# Patient Record
Sex: FEMALE
[De-identification: names, ages, dates, MRNs, and addresses within clinical notes are randomized per-mention and may not be internally consistent; named-entity substitution may affect disease eponyms.]

---

## 2022-10-06 DIAGNOSIS — Z00.00 ENCOUNTER FOR GENERAL ADULT MEDICAL EXAMINATION W/OUT ABNORMAL FINDINGS: ICD-10-CM

## 2022-10-07 ENCOUNTER — APPOINTMENT (OUTPATIENT)
Dept: ORTHOPEDIC SURGERY | Facility: CLINIC | Age: 62
End: 2022-10-07

## 2024-10-19 ENCOUNTER — NON-APPOINTMENT (OUTPATIENT)
Age: 64
End: 2024-10-19

## 2024-10-19 ENCOUNTER — INPATIENT (INPATIENT)
Facility: HOSPITAL | Age: 64
LOS: 4 days | Discharge: HOME | DRG: 313 | End: 2024-10-24
Attending: INTERNAL MEDICINE | Admitting: INTERNAL MEDICINE
Payer: MEDICARE

## 2024-10-19 VITALS
SYSTOLIC BLOOD PRESSURE: 144 MMHG | RESPIRATION RATE: 16 BRPM | WEIGHT: 179.9 LBS | OXYGEN SATURATION: 97 % | DIASTOLIC BLOOD PRESSURE: 87 MMHG | HEART RATE: 82 BPM | TEMPERATURE: 98 F | HEIGHT: 64 IN

## 2024-10-19 LAB
ALBUMIN SERPL ELPH-MCNC: 4.4 G/DL — SIGNIFICANT CHANGE UP (ref 3.3–5)
ALP SERPL-CCNC: 59 U/L — SIGNIFICANT CHANGE UP (ref 40–120)
ALT FLD-CCNC: 21 U/L — SIGNIFICANT CHANGE UP (ref 10–45)
ANION GAP SERPL CALC-SCNC: 13 MMOL/L — SIGNIFICANT CHANGE UP (ref 5–17)
AST SERPL-CCNC: 37 U/L — SIGNIFICANT CHANGE UP (ref 10–40)
BASOPHILS # BLD AUTO: 0.06 K/UL — SIGNIFICANT CHANGE UP (ref 0–0.2)
BASOPHILS NFR BLD AUTO: 0.5 % — SIGNIFICANT CHANGE UP (ref 0–2)
BILIRUB SERPL-MCNC: 0.7 MG/DL — SIGNIFICANT CHANGE UP (ref 0.2–1.2)
BUN SERPL-MCNC: 18 MG/DL — SIGNIFICANT CHANGE UP (ref 7–23)
CALCIUM SERPL-MCNC: 10.1 MG/DL — SIGNIFICANT CHANGE UP (ref 8.4–10.5)
CHLORIDE SERPL-SCNC: 107 MMOL/L — SIGNIFICANT CHANGE UP (ref 96–108)
CO2 SERPL-SCNC: 21 MMOL/L — LOW (ref 22–31)
CREAT SERPL-MCNC: 1 MG/DL — SIGNIFICANT CHANGE UP (ref 0.5–1.3)
EGFR: 63 ML/MIN/1.73M2 — SIGNIFICANT CHANGE UP
EOSINOPHIL # BLD AUTO: 0.03 K/UL — SIGNIFICANT CHANGE UP (ref 0–0.5)
EOSINOPHIL NFR BLD AUTO: 0.3 % — SIGNIFICANT CHANGE UP (ref 0–6)
GLUCOSE SERPL-MCNC: 105 MG/DL — HIGH (ref 70–99)
HCT VFR BLD CALC: 31.3 % — LOW (ref 34.5–45)
HGB BLD-MCNC: 11.5 G/DL — SIGNIFICANT CHANGE UP (ref 11.5–15.5)
IMM GRANULOCYTES NFR BLD AUTO: 0.6 % — SIGNIFICANT CHANGE UP (ref 0–0.9)
LYMPHOCYTES # BLD AUTO: 3.83 K/UL — HIGH (ref 1–3.3)
LYMPHOCYTES # BLD AUTO: 33.6 % — SIGNIFICANT CHANGE UP (ref 13–44)
MAGNESIUM SERPL-MCNC: 2.3 MG/DL — SIGNIFICANT CHANGE UP (ref 1.6–2.6)
MCHC RBC-ENTMCNC: 29.1 PG — SIGNIFICANT CHANGE UP (ref 27–34)
MCHC RBC-ENTMCNC: 36.7 GM/DL — HIGH (ref 32–36)
MCV RBC AUTO: 79.2 FL — LOW (ref 80–100)
MONOCYTES # BLD AUTO: 0.94 K/UL — HIGH (ref 0–0.9)
MONOCYTES NFR BLD AUTO: 8.3 % — SIGNIFICANT CHANGE UP (ref 2–14)
NEUTROPHILS # BLD AUTO: 6.46 K/UL — SIGNIFICANT CHANGE UP (ref 1.8–7.4)
NEUTROPHILS NFR BLD AUTO: 56.7 % — SIGNIFICANT CHANGE UP (ref 43–77)
NRBC # BLD: 0 /100 WBCS — SIGNIFICANT CHANGE UP (ref 0–0)
NT-PROBNP SERPL-SCNC: <36 PG/ML — SIGNIFICANT CHANGE UP (ref 0–300)
PHOSPHATE SERPL-MCNC: 3.8 MG/DL — SIGNIFICANT CHANGE UP (ref 2.5–4.5)
PLATELET # BLD AUTO: 346 K/UL — SIGNIFICANT CHANGE UP (ref 150–400)
POTASSIUM SERPL-MCNC: 4.6 MMOL/L — SIGNIFICANT CHANGE UP (ref 3.5–5.3)
POTASSIUM SERPL-SCNC: 4.6 MMOL/L — SIGNIFICANT CHANGE UP (ref 3.5–5.3)
PROT SERPL-MCNC: 8 G/DL — SIGNIFICANT CHANGE UP (ref 6–8.3)
RBC # BLD: 3.95 M/UL — SIGNIFICANT CHANGE UP (ref 3.8–5.2)
RBC # FLD: 16.2 % — HIGH (ref 10.3–14.5)
SODIUM SERPL-SCNC: 141 MMOL/L — SIGNIFICANT CHANGE UP (ref 135–145)
TROPONIN T, HIGH SENSITIVITY RESULT: <6 NG/L — SIGNIFICANT CHANGE UP (ref 0–51)
TROPONIN T, HIGH SENSITIVITY RESULT: <6 NG/L — SIGNIFICANT CHANGE UP (ref 0–51)
WBC # BLD: 11.39 K/UL — HIGH (ref 3.8–10.5)
WBC # FLD AUTO: 11.39 K/UL — HIGH (ref 3.8–10.5)

## 2024-10-19 PROCEDURE — 99223 1ST HOSP IP/OBS HIGH 75: CPT

## 2024-10-19 PROCEDURE — 71046 X-RAY EXAM CHEST 2 VIEWS: CPT | Mod: 26

## 2024-10-19 PROCEDURE — 71275 CT ANGIOGRAPHY CHEST: CPT | Mod: 26,MC

## 2024-10-19 PROCEDURE — 74174 CTA ABD&PLVS W/CONTRAST: CPT | Mod: 26,MC

## 2024-10-19 RX ORDER — APIXABAN 5 MG/1
5 TABLET, FILM COATED ORAL EVERY 12 HOURS
Refills: 0 | Status: DISCONTINUED | OUTPATIENT
Start: 2024-10-19 | End: 2024-10-24

## 2024-10-19 RX ORDER — ACETAMINOPHEN 500 MG
1000 TABLET ORAL ONCE
Refills: 0 | Status: COMPLETED | OUTPATIENT
Start: 2024-10-19 | End: 2024-10-19

## 2024-10-19 RX ORDER — MECLIZINE HCL 25 MG
50 TABLET ORAL ONCE
Refills: 0 | Status: COMPLETED | OUTPATIENT
Start: 2024-10-19 | End: 2024-10-19

## 2024-10-19 RX ADMIN — Medication 400 MILLIGRAM(S): at 17:29

## 2024-10-19 RX ADMIN — Medication 50 MILLIGRAM(S): at 21:10

## 2024-10-19 RX ADMIN — APIXABAN 5 MILLIGRAM(S): 5 TABLET, FILM COATED ORAL at 23:29

## 2024-10-19 RX ADMIN — Medication 2000 MILLILITER(S): at 21:11

## 2024-10-19 NOTE — ED ADULT NURSE NOTE - OBJECTIVE STATEMENT
1702 pt 64yf aox4 bib ems/go health c/o cp sob on exertion, sent for eval hx pe, vitals wnl, able to verbalize concerns, given asa 324 and iv zofran 4mg, ivl rt fa 20g from ems pending eval

## 2024-10-19 NOTE — ED PROVIDER NOTE - PROGRESS NOTE DETAILS
NILE Kelly PGY-3: Dr. Crowell at bedside, thinking this is a pneumonia.  Patient would be admitted to his service.

## 2024-10-19 NOTE — ED ADULT NURSE REASSESSMENT NOTE - NSFALLHARMRISKINTERV_ED_ALL_ED
Communicate risk of Fall with Harm to all staff, patient, and family/Encourage patient to sit up slowly, dangle for a short time, stand at bedside before walking/Monitor gait and stability/Move patient closer to nursing station/within visual sight of ED staff/Provide visual cue: red socks, yellow wristband, yellow gown, etc/Reinforce activity limits and safety measures with patient and family/Bed in lowest position, wheels locked, appropriate side rails in place/Call bell, personal items and telephone in reach/Instruct patient to call for assistance before getting out of bed/chair/stretcher/Non-slip footwear applied when patient is off stretcher/[place] to call system/Physically safe environment - no spills, clutter or unnecessary equipment/Purposeful Proactive Rounding/Room/bathroom lighting operational, light cord in reach

## 2024-10-19 NOTE — ED PROVIDER NOTE - OBJECTIVE STATEMENT
64 yof hx of PE on AC and compliant, BIBEMS from clinic c/f waxin waning CP. is described as dull pressure in midchest. No fevers, no chills, n/v/d. Given ASA enroute.

## 2024-10-19 NOTE — ED PROVIDER NOTE - ATTENDING CONTRIBUTION TO CARE
------------ATTENDING NOTE------------  pt w/  brought to ED by EMS from outpt clinic for concerns of chest pain, pt describes sudden onset of constant waxing/waning chest pain, dull ache in center of chest radiating to back and down to abdomen and LLE, slight lightheaded/sob, no numbness/weakness, different than past PE and pt compliant w/ Eliquis,  mg by EMS, exam w/ equal distal pulses, overall soft benign abd, clear chest w/o distress, nml cardiac sounds -- awaiting EKG, labs, imaging, close reassessments -->  - Adolfo Barnard MD   ---------------------------------------------------------- ------------ATTENDING NOTE------------  pt w/  brought to ED by EMS from outpt clinic for concerns of chest pain, pt describes sudden onset of constant waxing/waning chest pain, dull ache in center of chest radiating to back and down to abdomen and LLE, slight lightheaded/sob, no numbness/weakness, different than past PE and pt compliant w/ Eliquis,  mg by EMS, exam w/ equal distal pulses, overall soft benign abd, clear chest w/o distress, nml cardiac sounds -- awaiting EKG, labs, imaging, close reassessments --> initial labs/imaging w/o acute cardiac/pulm process, plan for CDU for TTE, cardiac monitoring, pt's Cardiologist consult, close reassessments for tx/dispo decisions.  - Adolfo Barnard MD   ----------------------------------------------------------

## 2024-10-19 NOTE — ED PROVIDER NOTE - PHYSICAL EXAMINATION
Patient no apparent distress, normal S1-S2, soft nontender abdomen.  No distention, there is no pedal edema.  No swelling bilateral unilateral of the lower extremities.  Breath sounds are clear as well bilaterally.  No rhonchi.  Patient without any accessory muscle usage.  Patient without any pulsatile masses appreciated in middle of her abdomen.

## 2024-10-19 NOTE — ED ADULT NURSE NOTE - NSFALLHARMRISKINTERV_ED_ALL_ED

## 2024-10-19 NOTE — ED CDU PROVIDER INITIAL DAY NOTE - OBJECTIVE STATEMENT
63 y/o F with PMH HTN, HLD, PE on Eliquis presents to the ER with chest pain starting at noon today. Reports sharp left sided chest pain that radiates to the middle of her chest/back/left leg. At this time reports improvement in pain although still "achy." Reports feeling more shortness of breath than at her baseline and feels more lightheaded. Since onset of symptoms she reports feeling like her right arm is weaker than her left, although does endorse improvement at this time. Denies fevers, cough, syncope. Reports having a stress test with her cardiologist last week, although unsure of results at this time. 63 y/o F with PMH HTN, HLD, PE on Eliquis presents to the ER with chest pain starting at noon today. Reports sharp left sided chest pain that radiates to the middle of her chest/back/left leg. At this time reports improvement in pain although still "achy." Reports feeling more shortness of breath than at her baseline and feels more lightheaded. Since onset of symptoms she reports feeling like her right arm is weaker than her left, although does endorse improvement at this time. Denies fevers, cough, syncope. Reports having a stress test with her cardiologist last week, although unsure of results at this time.  ED course: EKG NSR. Troponin <6, <6. 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 Plan for tele monitoring, echocardiogram, and cardiology consult in CDU.

## 2024-10-19 NOTE — ED CDU PROVIDER INITIAL DAY NOTE - PROGRESS NOTE DETAILS
Upon my initial CDU evaluation patient endorsed dizziness with ambulation, given IVF and Meclizine with patient endorsing improvement in symptoms afterwards, with steady gait on reevaluation. Patient also mentioned to myself that since onset of symptoms at noon today she noticed that her right arm felt weaker than her left arm. Patient with equal 5/5 strength bilateral upper extremities, denying numbness. Discussed with Dr. Barnard- states would not obtain CT head at this time, would continue to monitor on tele and obtain echocardiogram and cardiology consult. - Becca Mandujano PA-C Upon my initial CDU evaluation patient endorsed dizziness with ambulation, given IVF and Meclizine with patient endorsing improvement in symptoms afterwards, with steady gait on reevaluation. Patient also mentioned to myself that since onset of symptoms at noon today she noticed that her right arm felt weaker than her left arm. Patient with equal 5/5 strength bilateral upper extremities, no pronator drift, denying numbness. Discussed with Dr. Barnard- states would not obtain CT head at this time, would continue to monitor on tele and obtain echocardiogram and cardiology consult. - Becca Mandujano PA-C

## 2024-10-20 DIAGNOSIS — I26.99 OTHER PULMONARY EMBOLISM WITHOUT ACUTE COR PULMONALE: ICD-10-CM

## 2024-10-20 DIAGNOSIS — J18.9 PNEUMONIA, UNSPECIFIED ORGANISM: ICD-10-CM

## 2024-10-20 DIAGNOSIS — R07.9 CHEST PAIN, UNSPECIFIED: ICD-10-CM

## 2024-10-20 LAB
ALBUMIN SERPL ELPH-MCNC: 3.8 G/DL — SIGNIFICANT CHANGE UP (ref 3.3–5)
ALP SERPL-CCNC: 53 U/L — SIGNIFICANT CHANGE UP (ref 40–120)
ALT FLD-CCNC: 20 U/L — SIGNIFICANT CHANGE UP (ref 10–45)
ANION GAP SERPL CALC-SCNC: 16 MMOL/L — SIGNIFICANT CHANGE UP (ref 5–17)
AST SERPL-CCNC: 31 U/L — SIGNIFICANT CHANGE UP (ref 10–40)
BASOPHILS # BLD AUTO: 0.1 K/UL — SIGNIFICANT CHANGE UP (ref 0–0.2)
BASOPHILS NFR BLD AUTO: 1.1 % — SIGNIFICANT CHANGE UP (ref 0–2)
BILIRUB SERPL-MCNC: 0.7 MG/DL — SIGNIFICANT CHANGE UP (ref 0.2–1.2)
BUN SERPL-MCNC: 14 MG/DL — SIGNIFICANT CHANGE UP (ref 7–23)
CALCIUM SERPL-MCNC: 9.5 MG/DL — SIGNIFICANT CHANGE UP (ref 8.4–10.5)
CHLORIDE SERPL-SCNC: 105 MMOL/L — SIGNIFICANT CHANGE UP (ref 96–108)
CO2 SERPL-SCNC: 17 MMOL/L — LOW (ref 22–31)
CREAT SERPL-MCNC: 0.92 MG/DL — SIGNIFICANT CHANGE UP (ref 0.5–1.3)
EGFR: 69 ML/MIN/1.73M2 — SIGNIFICANT CHANGE UP
EOSINOPHIL # BLD AUTO: 0.08 K/UL — SIGNIFICANT CHANGE UP (ref 0–0.5)
EOSINOPHIL NFR BLD AUTO: 0.8 % — SIGNIFICANT CHANGE UP (ref 0–6)
FLUAV AG NPH QL: SIGNIFICANT CHANGE UP
FLUBV AG NPH QL: SIGNIFICANT CHANGE UP
GLUCOSE SERPL-MCNC: 98 MG/DL — SIGNIFICANT CHANGE UP (ref 70–99)
HCT VFR BLD CALC: 34.1 % — LOW (ref 34.5–45)
HGB BLD-MCNC: 12 G/DL — SIGNIFICANT CHANGE UP (ref 11.5–15.5)
IMM GRANULOCYTES NFR BLD AUTO: 0.2 % — SIGNIFICANT CHANGE UP (ref 0–0.9)
LYMPHOCYTES # BLD AUTO: 5.32 K/UL — HIGH (ref 1–3.3)
LYMPHOCYTES # BLD AUTO: 56.4 % — HIGH (ref 13–44)
MCHC RBC-ENTMCNC: 28.3 PG — SIGNIFICANT CHANGE UP (ref 27–34)
MCHC RBC-ENTMCNC: 35.2 GM/DL — SIGNIFICANT CHANGE UP (ref 32–36)
MCV RBC AUTO: 80.4 FL — SIGNIFICANT CHANGE UP (ref 80–100)
MONOCYTES # BLD AUTO: 0.69 K/UL — SIGNIFICANT CHANGE UP (ref 0–0.9)
MONOCYTES NFR BLD AUTO: 7.3 % — SIGNIFICANT CHANGE UP (ref 2–14)
NEUTROPHILS # BLD AUTO: 3.22 K/UL — SIGNIFICANT CHANGE UP (ref 1.8–7.4)
NEUTROPHILS NFR BLD AUTO: 34.2 % — LOW (ref 43–77)
NRBC # BLD: 0 /100 WBCS — SIGNIFICANT CHANGE UP (ref 0–0)
PLATELET # BLD AUTO: 254 K/UL — SIGNIFICANT CHANGE UP (ref 150–400)
POTASSIUM SERPL-MCNC: 4.2 MMOL/L — SIGNIFICANT CHANGE UP (ref 3.5–5.3)
POTASSIUM SERPL-SCNC: 4.2 MMOL/L — SIGNIFICANT CHANGE UP (ref 3.5–5.3)
PROT SERPL-MCNC: 7.4 G/DL — SIGNIFICANT CHANGE UP (ref 6–8.3)
RBC # BLD: 4.24 M/UL — SIGNIFICANT CHANGE UP (ref 3.8–5.2)
RBC # FLD: 16 % — HIGH (ref 10.3–14.5)
RSV RNA NPH QL NAA+NON-PROBE: SIGNIFICANT CHANGE UP
SARS-COV-2 RNA SPEC QL NAA+PROBE: SIGNIFICANT CHANGE UP
SODIUM SERPL-SCNC: 138 MMOL/L — SIGNIFICANT CHANGE UP (ref 135–145)
WBC # BLD: 9.43 K/UL — SIGNIFICANT CHANGE UP (ref 3.8–10.5)
WBC # FLD AUTO: 9.43 K/UL — SIGNIFICANT CHANGE UP (ref 3.8–10.5)

## 2024-10-20 PROCEDURE — 76376 3D RENDER W/INTRP POSTPROCES: CPT | Mod: 26

## 2024-10-20 PROCEDURE — 93306 TTE W/DOPPLER COMPLETE: CPT | Mod: 26

## 2024-10-20 PROCEDURE — 99233 SBSQ HOSP IP/OBS HIGH 50: CPT

## 2024-10-20 RX ORDER — CEFTRIAXONE SODIUM 10 G
1000 VIAL (EA) INJECTION EVERY 24 HOURS
Refills: 0 | Status: COMPLETED | OUTPATIENT
Start: 2024-10-20 | End: 2024-10-24

## 2024-10-20 RX ORDER — INFLUENZ VIR VAC TV P-SURF2003 15MCG/.5ML
0.5 SYRINGE (ML) INTRAMUSCULAR ONCE
Refills: 0 | Status: DISCONTINUED | OUTPATIENT
Start: 2024-10-20 | End: 2024-10-24

## 2024-10-20 RX ADMIN — APIXABAN 5 MILLIGRAM(S): 5 TABLET, FILM COATED ORAL at 21:21

## 2024-10-20 RX ADMIN — Medication 100 MILLIGRAM(S): at 06:44

## 2024-10-20 RX ADMIN — APIXABAN 5 MILLIGRAM(S): 5 TABLET, FILM COATED ORAL at 10:44

## 2024-10-20 NOTE — CONSULT NOTE ADULT - ASSESSMENT
64 yof well known to me from office w/ hx of PE on AC and compliant, BIBEMS from clinic c/f waxing waning CP. is described as dull pressure in midchest. No fevers, no chills, n/v/d. Given ASA enrout  Of note she had a Nuclear stress test in my office last week

## 2024-10-20 NOTE — H&P ADULT - NSHPLABSRESULTS_GEN_ALL_CORE
< from: CT Angio Chest Aorta w/wo IV Cont (10.19.24 @ 18:12) >    IMPRESSION:  No aortic dissection or aortic aneurysm or pulmonary embolism.    Bilateral mosaic lungattenuation mostly in the lower lobes. Differential   diagnosis includes air trapping, alveolar edema, or groundglass   infiltrates. Small patchy bilateral lower lobe opacities could represent   developing pneumonia.    Gastric wall thickening versusunderdistention, correlate clinically for   gastritis.    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 Patchy sclerosis   L4, L5 and S1 vertebral bodies. Correlate clinically for history of   sickle cell disease.    Additional findings noted above.    --- End of Report ---      < end of copied text >    Troponin T, High Sensitivity (10.19.24 @ 20:01)   Troponin T, High Sensitivity Result: <6: * Troponin T, High Sensitivity Result: <6: *                               12.0   9.43  )-----------( 254      ( 20 Oct 2024 04:45 )             34.1     10-20    138  |  105  |  14  ----------------------------<  98  4.2   |  17[L]  |  0.92    Ca    9.5      20 Oct 2024 04:45  Phos  3.8     10-19  Mg     2.3     10-19    TPro  7.4  /  Alb  3.8  /  TBili  0.7  /  DBili  x   /  AST  31  /  ALT  20  /  AlkPhos  53  10-20    proBNP:   Lipid Profile:   HgA1c:   TSH:

## 2024-10-20 NOTE — ED CDU PROVIDER SUBSEQUENT DAY NOTE - ATTENDING APP SHARED VISIT CONTRIBUTION OF CARE
Guzman Pozo MD (Attending Physician):    I performed a history and physical exam of the patient and discussed their management with the HOWARD. I have reviewed the HOWARD note and agree with the documented findings and plan of care, except as noted. This was a shared visit with an HOWARD. I reviewed and verified the documentation and independently performed my own history/exam/medical decision making. My medical decision making and observations are found below. Please refer to any progress notes for updates on clinical course.    HPI:  63 y/o F with PMH HTN, HLD, PE on Eliquis presents to the ER with chest pain starting at noon today. Reports sharp left sided chest pain that radiates to the middle of her chest/back/left leg. At this time reports improvement in pain although still "achy." Reports feeling more shortness of breath than at her baseline and feels more lightheaded. Since onset of symptoms she reports feeling like her right arm is weaker than her left, although does endorse improvement at this time. Denies fevers, cough, syncope. Reports having a stress test with her cardiologist last week, although unsure of results at this time.    PE:  GEN - NAD, well appearing, A&Ox3  HEAD - NC/AT  EYES - PERRL, EOMI  ENT - Airway patent, mucous membranes moist  PULMONARY - CTA b/l, symmetric breath sounds, no W/R/R  CARDIAC - +S1S2, RRR, no M/G/R, no JVD  ABDOMEN - +BS, ND, NT, soft, no guarding, no rebound, no masses, no rigidity   - No CVA TTP b/l  EXTREMITIES - FROM, symmetric pulses, no edema  SKIN - No rash or bruising  NEUROLOGIC - Alert, speech clear, moving all extremities spontaneously, no focal deficits  PSYCH - Normal mood/affect, normal insight    MDM:  ED course: EKG NSR. Troponin <6, <6. 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 Pt sent to CDU for tele monitoring, echocardiogram, and cardiology consult.

## 2024-10-20 NOTE — H&P ADULT - NSHPREVIEWOFSYSTEMS_GEN_ALL_CORE
REVIEW OF SYSTEMS:  CONSTITUTIONAL: No fever, weight loss, or fatigue  EYES: No eye pain, visual disturbances, or discharge  ENMT:  No difficulty hearing, tinnitus, vertigo; No sinus or throat pain  NECK: No pain or stiffness  RESPIRATORY:+cough, wheezing, chills or hemoptysis; No Shortness of Breath  CARDIOVASCULAR:+ chest pain, palpitations, passing out, dizziness, or leg swelling  GASTROINTESTINAL: No abdominal or epigastric pain. No nausea, vomiting, or hematemesis; No diarrhea or constipation. No melena or hematochezia.  GENITOURINARY: No dysuria, frequency, hematuria, or incontinence  NEUROLOGICAL: No headaches, memory loss, loss of strength, numbness, or tremors  SKIN: No itching, burning, rashes, or lesions   LYMPH Nodes: No enlarged glands  ENDOCRINE: No heat or cold intolerance; No hair loss  MUSCULOSKELETAL: No joint pain or swelling; No muscle, back, or extremity pain  PSYCHIATRIC: No depression, anxiety, mood swings, or difficulty sleeping  HEME/LYMPH: No easy bruising, or bleeding gums  ALLERY AND IMMUNOLOGIC: No hives or eczema	    [ ] All others negative	  [ ] Unable to obtain

## 2024-10-20 NOTE — ED CDU PROVIDER SUBSEQUENT DAY NOTE - PROGRESS NOTE DETAILS
Dr. Spears at bedside, recommending admission to his service for treatment for pneumonia, along with full RVP. Discussed with CDU attending Dr. Pozo. - Becca Mandujano PA-C

## 2024-10-20 NOTE — CONSULT NOTE ADULT - PROBLEM SELECTOR RECOMMENDATION 9
mainly non cardiac features   Normal SPECT without any ischemia last week in my office   has normal LVEF  symptoms likely secondary to PNA

## 2024-10-20 NOTE — H&P ADULT - NSHPPHYSICALEXAM_GEN_ALL_CORE
PHYSICAL EXAM:  T(C): 36.7 (10-20-24 @ 04:19), Max: 36.9 (10-19-24 @ 16:49)  HR: 65 (10-20-24 @ 04:19) (65 - 82)  BP: 126/81 (10-20-24 @ 04:19) (109/68 - 144/87)  RR: 18 (10-20-24 @ 04:19) (16 - 18)  SpO2: 98% (10-20-24 @ 04:19) (97% - 98%)  Wt(kg): --  I&O's Summary      Appearance: Normal	  HEENT:   Normal oral mucosa, PERRL, EOMI	  Lymphatic: No lymphadenopathy  Cardiovascular: Normal S1 S2, No JVD, No murmurs, No edema  Respiratory: Lungs clear to auscultation	  Psychiatry: A & O x 3, Mood & affect appropriate  Gastrointestinal:  Soft, Non-tender, + BS	  Skin: No rashes, No ecchymoses, No cyanosis	  Neurologic: Non-focal  Extremities: Normal range of motion, No clubbing, cyanosis or edema  Vascular: Peripheral pulses palpable 2+ bilaterally

## 2024-10-20 NOTE — ED CDU PROVIDER DISPOSITION NOTE - NSFOLLOWUPINSTRUCTIONS_ED_ALL_ED_FT
Hydrate.     We recommend you follow up with your primary care provider within the next 2-3 days, please bring all of your results with you.     Please return to the Emergency Department with new, worsening, or concerning symptoms, such as:  -Shortness of breath or trouble breathing  -Pressure, pain, tightness in chest  -Facial drooping, arm weakness, or speech difficulty   -Head injury or loss of consciousness   -Nonstop bleeding or an open wound     *More detailed information regarding your visit and discharge can be found by reviewing this packet

## 2024-10-20 NOTE — H&P ADULT - PROBLEM SELECTOR PLAN 1
mainly non cardiac features   Normal SPECT without any ischemia last week in my office   has normal LVEF  symptoms likely secondary to PNA.

## 2024-10-20 NOTE — ED ADULT NURSE REASSESSMENT NOTE - NS ED NURSE REASSESS COMMENT FT1
Pt received from MATILDE Roman. Pt oriented to CDU & plan of care was discussed. Pt A&O x 4. Pt admitted waiting for a bed. Pt c/o 2/10 chest pain that comes and goes since yesterday and some SOB. Pt denies any dizziness or palpitations as of now. Pt on a cardiac monitor in NSR, HR in 60's. V/S stable, pt afebrile,  IV in place, patent and free of signs of infiltration. Pt resting in bed. Safety & comfort measures maintained. Call bell in reach. Will continue to monitor.
Pt received from MATILDE Camilo. Pt oriented to CDU & plan of care was discussed. Pt A&O x 4. Independent. Pt in CDU for telemetry and TTE.  Pt denies any chest pain, SOB, dizziness or palpitations at this time. V/S stable, pt afebrile,  IV in place, patent and free of signs of infiltration. Pt resting in bed. Safety & comfort measures maintained. Call bell in reach. Care continues.

## 2024-10-20 NOTE — ED CDU PROVIDER SUBSEQUENT DAY NOTE - PHYSICAL EXAMINATION
· CONSTITUTIONAL: Well appearing, awake, alert, oriented to person, place, time/situation and in no apparent distress.  · ENMT: Airway patent.  · EYES: Clear bilaterally  · CARDIAC: Normal rate, regular rhythm.  Heart sounds S1, S2.  No murmur  · RESPIRATORY: Breath sounds clear and equal bilaterally.  · GASTROINTESTINAL: Abdomen soft, non-tender  · MUSCULOSKELETAL: Steady gait  · NEUROLOGICAL: Strength 5/5 bilateral UE/LE, sensation grossly intact, clear speech, follows commands  · PSYCHIATRIC: normal mood and affect.

## 2024-10-20 NOTE — ED CDU PROVIDER DISPOSITION NOTE - CLINICAL COURSE
65 y/o F with PMH HTN, HLD, PE on Eliquis presents to the ER with chest pain starting at noon today. Reports sharp left sided chest pain that radiates to the middle of her chest/back/left leg. At this time reports improvement in pain although still "achy." Reports feeling more shortness of breath than at her baseline and feels more lightheaded. Since onset of symptoms she reports feeling like her right arm is weaker than her left, although does endorse improvement at this time. Denies fevers, cough, syncope. Reports having a stress test with her cardiologist last week, although unsure of results at this time.  ED course: EKG NSR. Troponin <6, <6. 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 Plan for tele monitoring, echocardiogram, and cardiology consult in CDU. 65 y/o F with PMH HTN, HLD, PE on Eliquis presents to the ER with chest pain starting at noon today. Reports sharp left sided chest pain that radiates to the middle of her chest/back/left leg. At this time reports improvement in pain although still "achy." Reports feeling more shortness of breath than at her baseline and feels more lightheaded. Since onset of symptoms she reports feeling like her right arm is weaker than her left, although does endorse improvement at this time. Denies fevers, cough, syncope. Reports having a stress test with her cardiologist last week, although unsure of results at this time.  ED course: EKG NSR. Troponin <6, <6. 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 Plan for tele monitoring, echocardiogram, and cardiology consult in CDU. In CDU, patient evaluated by Dr. Spears, who accepted patient to his service for admission for pneumonia. Discussed with Dr. Pozo.

## 2024-10-20 NOTE — ED CDU PROVIDER DISPOSITION NOTE - ATTENDING APP SHARED VISIT CONTRIBUTION OF CARE
Guzman Pozo MD (Attending Physician):    I performed a history and physical exam of the patient and discussed their management with the HOWARD. I have reviewed the HOWARD note and agree with the documented findings and plan of care, except as noted. This was a shared visit with an HOWARD. I reviewed and verified the documentation and independently performed my own history/exam/medical decision making. My medical decision making and observations are found below. Please refer to any progress notes for updates on clinical course.    HPI:  65 y/o F with PMH HTN, HLD, PE on Eliquis presents to the ER with chest pain starting at noon today. Reports sharp left sided chest pain that radiates to the middle of her chest/back/left leg. At this time reports improvement in pain although still "achy." Reports feeling more shortness of breath than at her baseline and feels more lightheaded. Since onset of symptoms she reports feeling like her right arm is weaker than her left, although does endorse improvement at this time. Denies fevers, cough, syncope. Reports having a stress test with her cardiologist last week, although unsure of results at this time.    PE:  GEN - NAD, well appearing, A&Ox3  HEAD - NC/AT  EYES - PERRL, EOMI  ENT - Airway patent, mucous membranes moist  PULMONARY - CTA b/l, symmetric breath sounds, no W/R/R  CARDIAC - +S1S2, RRR, no M/G/R, no JVD  ABDOMEN - +BS, ND, NT, soft, no guarding, no rebound, no masses, no rigidity   - No CVA TTP b/l  EXTREMITIES - FROM, symmetric pulses, no edema  SKIN - No rash or bruising  NEUROLOGIC - Alert, speech clear, moving all extremities spontaneously, no focal deficits  PSYCH - Normal mood/affect, normal insight    MDM:  ED course: EKG NSR. Troponin <6, <6. 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 Pt sent to CDU for tele monitoring, echocardiogram, and cardiology consult. In CDU, patient evaluated by Dr. Spears, who accepted patient to his service for admission for pneumonia.

## 2024-10-20 NOTE — ED CDU PROVIDER SUBSEQUENT DAY NOTE - HISTORY
No interval changes since initial CDU provider note. Pt without new complaint. NAD VSS. no events on tele. Pending echocardiogram and cardiology consult. - LUIS Mandujano

## 2024-10-21 ENCOUNTER — TRANSCRIPTION ENCOUNTER (OUTPATIENT)
Age: 64
End: 2024-10-21

## 2024-10-21 LAB — ADD ON TEST-SPECIMEN IN LAB: SIGNIFICANT CHANGE UP

## 2024-10-21 RX ORDER — FAMOTIDINE 10 MG/ML
20 INJECTION INTRAVENOUS DAILY
Refills: 0 | Status: DISCONTINUED | OUTPATIENT
Start: 2024-10-21 | End: 2024-10-24

## 2024-10-21 RX ORDER — FLUTICASONE PROPIONATE AND SALMETEROL XINAFOATE 230; 21 UG/1; UG/1
1 AEROSOL, METERED RESPIRATORY (INHALATION)
Refills: 0 | Status: DISCONTINUED | OUTPATIENT
Start: 2024-10-21 | End: 2024-10-24

## 2024-10-21 RX ORDER — PANTOPRAZOLE SODIUM 40 MG/1
40 TABLET, DELAYED RELEASE ORAL
Refills: 0 | Status: DISCONTINUED | OUTPATIENT
Start: 2024-10-21 | End: 2024-10-24

## 2024-10-21 RX ADMIN — APIXABAN 5 MILLIGRAM(S): 5 TABLET, FILM COATED ORAL at 09:42

## 2024-10-21 RX ADMIN — Medication 100 MILLIGRAM(S): at 05:11

## 2024-10-21 RX ADMIN — FLUTICASONE PROPIONATE AND SALMETEROL XINAFOATE 1 DOSE(S): 230; 21 AEROSOL, METERED RESPIRATORY (INHALATION) at 17:26

## 2024-10-21 RX ADMIN — APIXABAN 5 MILLIGRAM(S): 5 TABLET, FILM COATED ORAL at 22:05

## 2024-10-21 RX ADMIN — FAMOTIDINE 20 MILLIGRAM(S): 10 INJECTION INTRAVENOUS at 14:25

## 2024-10-21 NOTE — DISCHARGE NOTE PROVIDER - NSDCFUADDAPPT_GEN_ALL_CORE_FT
APPTS ARE READY TO BE MADE: [X] YES    Best Family or Patient Contact (if needed):     Additional Information about above appointments (if needed):    1: PCP		  2: Cards  3: Heme/Onc    Other comments or requests:    APPTS ARE READY TO BE MADE: [X] YES    Best Family or Patient Contact (if needed):     Additional Information about above appointments (if needed):    1: PCP		  2: Cards  3: Heme/Onc    Other comments or requests:     Hematology/Oncology:  Provider's office was contacted to secure an appointment, however the office will follow up with the patient/caregiver directly. Appointment request sent to IPR team.    Cardiology:  Provided patient with provider referral information, however patient prefers to schedule the appointments on their own.   APPTS ARE READY TO BE MADE: [X] YES    Best Family or Patient Contact (if needed):     Additional Information about above appointments (if needed):    1: PCP		  2: Cards  3: Heme/Onc    Other comments or requests:     Pulmonary Disease  Appointment was scheduled in Henri Hi MD, 10/31/2024 at 10:30 AM  [address] [place] [address] [phone]  (*Dr Nur not in network with [facility] Medicare insurance)      Hematology/Oncology:  Provider's office was contacted to secure an appointment, however the office will follow up with the patient/caregiver directly. Appointment request sent to IPR team.    Cardiology:  Provided patient with provider referral information, however patient prefers to schedule the appointments on their own.   APPTS ARE READY TO BE MADE: [X] YES    Best Family or Patient Contact (if needed):     Additional Information about above appointments (if needed):    1: PCP		  2: Cards  3: Heme/Onc (will follow up with [facility])    Other comments or requests:     Pulmonary Disease  Appointment was scheduled in Henri Hi MD, 10/31/2024 at 10:30 AM  [address] [place] [address] [phone]  (*Dr Nur not in network with [facility] Medicare insurance)      Hematology/Oncology:  Provider's office was contacted to secure an appointment, however the office will follow up with the patient/caregiver directly. Appointment request sent to IPR team.    Cardiology:  Provided patient with provider referral information, however patient prefers to schedule the appointments on their own.

## 2024-10-21 NOTE — DISCHARGE NOTE PROVIDER - NSRESEARCHGRANT_MLMHIDDEN_GEN_A_CORE
[facility] Infectious Disease Progress Note    Siddharth Gordillo Patient Status:  Inpatient    1962 MRN [id number]   Location [address] TELEMETRY Attending Amarilys Caceres MD   Hosp Day # 14 PCP Tam Li MD       ASSESSMENT:   - Covid infection  - Covid PNA  - Acute hypoxic respiratory failure  - BLL opacities, ? aspiration PNA  - R index finger wound, cellulitis  - R index finger tenosynovitis  - N/V/D  - DM  - HTN    PLAN:   - vancomycin, cefepime discontinued per primary s/p 10 abx  - Completed remdesivir on   - steroids per pulm   - Hand surgery eval noted - no OR plans  - CTM labs/vitals  - continue covid isolation  - monitor O2 sats and wean O2 as able  - Monitor temps, 02 saturations  - d/w patient, RN  - further recommendations pending above  - We will continue to follow with you.         Subjective:  Patient is overall feeling improved. 02 weaning to 5L NC. Denies chest pain.     Past Medical History  Past Medical History:   Diagnosis Date   • Diabetes mellitus (CMS/HCC)    • DM II (diabetes mellitus, type II), controlled (CMS/HCC)    • Essential (primary) hypertension    • HTN (hypertension), benign    • Pneumonia due to COVID-19 virus         Surgical History  No past surgical history on file.     Social History  Social History     Tobacco Use   • Smoking status: Never Smoker   • Smokeless tobacco: Never Used   Substance Use Topics   • Alcohol use: Not Currently   • Drug use: Not Currently       Family History    Family History   Problem Relation Age of Onset   • Diabetes Mother    • Heart disease Father        Allergies:  ALLERGIES:  No Known Allergies    Medications:  Current Facility-Administered Medications   Medication Dose Route Frequency Provider Last Rate Last Admin   • dexamethasone (DECADRON) injection 8 mg  8 mg Intravenous Q12H Tacos Cordova MD   8 mg at 21 0919   • enoxaparin (LOVENOX) injection 70 mg  1 mg/kg Subcutaneous 2 times per day Crystal ROUSE  MD Eladio   70 mg at 12/22/21 0919   • hydrALAZINE (APRESOLINE) injection 10 mg  10 mg Intravenous BID Amarilys Caceres MD   10 mg at 12/22/21 0919   • famotidine (PEPCID) injection 20 mg  20 mg Intravenous 2 times per day Amarilys Caceres MD   20 mg at 12/22/21 0919   • insulin glargine (LANTUS) injection 15 Units  15 Units Subcutaneous Nightly Qian Larson MD   15 Units at 12/21/21 2129   • carvedilol (COREG) tablet 6.25 mg  6.25 mg Oral 2 times per day Harley Jacobson MD   6.25 mg at 12/22/21 0919   • benzonatate (TESSALON PERLES) capsule 100 mg  100 mg Oral TID PRN Qian Larson MD       • albuterol inhaler 2 puff  2 puff Inhalation Q4H Resp PRN Qian Larson MD   2 puff at 12/10/21 1110   • docusate sodium (COLACE) capsule 100 mg  100 mg Oral BID PRN Qian Larson MD       • guaiFENesin-DM) (ROBITUSSIN DM) 100-10 MG/5ML syrup 5 mL  5 mL Oral Q4H PRN Qian Larson MD   5 mL at 12/10/21 1906   • melatonin tablet 3 mg  3 mg Oral QHS PRN Qian Larson MD       • simethicone (MYLICON) tablet 125 mg  125 mg Oral 4x Daily PRN Qian Larson MD       • sodium chloride 0.9 % flush bag 25 mL  25 mL Intravenous PRN Qian Larson MD       • sodium chloride (PF) 0.9 % injection 2 mL  2 mL Intracatheter 2 times per day Qian Larson MD   2 mL at 12/22/21 0926   • Potassium Standard Replacement Protocol   Does not apply See Admin Instructions Qian Larson MD       • Magnesium Standard Replacement Protocol   Does not apply See Admin Instructions Qian Larson MD       • ondansetron (ZOFRAN) injection 4 mg  4 mg Intravenous BID PRN Qian Larson MD   4 mg at 12/14/21 2242   • acetaminophen (TYLENOL) tablet 650 mg  650 mg Oral Q4H PRN Qian Larson MD   650 mg at 12/09/21 1759   • HYDROcodone-acetaminophen (NORCO) 5-325 MG per tablet 1 tablet  1 tablet Oral Q4H PRAINSLEY Larson MD       • docusate sodium-sennosides (SENOKOT S) 50-8.6 MG 2 tablet  2 tablet Oral Daily PRN Qian Larson MD       •  sodium chloride 0.9 % flush bag 25 mL  25 mL Intravenous PRN Qian Larson MD       • dextrose 50 % injection 25 g  25 g Intravenous PRN Qian Larson MD       • dextrose 50 % injection 12.5 g  12.5 g Intravenous PRN Qian Larson MD       • glucagon (GLUCAGEN) injection 1 mg  1 mg Intramuscular PRN Qian Larson MD       • dextrose (GLUTOSE) 40 % gel 15 g  15 g Oral PRN Qian Larson MD       • dextrose (GLUTOSE) 40 % gel 30 g  30 g Oral PRN Qian Larson MD       • insulin lispro (ADMELOG,HumaLOG) - Correction Dose   Subcutaneous TID WC Qian Larson MD   6 Units at 21 0930   • potassium CHLORIDE (KLOR-CON M) cam ER tablet 40 mEq  40 mEq Oral Once Qian Larson MD                  Physical Exam:     Patient not examined due to covid-19 and preservation of PPE.  Weight:   Wt Readings from Last 3 Encounters:   21 70.3 kg (155 lb)   20 68.5 kg (151 lb)   19 64.5 kg (142 lb 3.2 oz)   , Weight change:   Height:   Ht Readings from Last 3 Encounters:   21 5' 9\" (1.753 m)   20 5' 9\" (1.753 m)   19 5' 9\" (1.753 m)      Tmax Temp (24hrs), Av.9 °F (36.6 °C), Min:97.3 °F (36.3 °C), Max:98.4 °F (36.9 °C)     Last Vitals   Vitals:    21 1138   BP: 125/64   Pulse: 95   Resp: 18   Temp: 97.3 °F (36.3 °C)       Results:  Labs:   Recent Labs     21  0413 21  0500 21  0520   WBC 27.7* 28.8* 21.8*   HGB 7.3* 7.4* 7.3*   HCT 22.3* 22.9* 23.5*    396 401   MCV 91.8 92.3 95.5       Recent Labs   Lab 21  0520 21  0500 21  0500 21  0435 21  0435   SODIUM 135  --  135  --  137   POTASSIUM 4.5  --  4.3  --  3.6   CHLORIDE 102  --  102  --  104   CO2 26  --  24  --  23   BUN 29*  --  29*  --  26*   CREATININE 1.06  --  1.04  --  1.01   GLUCOSE 293*   < > 273*   < > 244*   CALCIUM 8.3*  --  8.2*  --  8.5    < > = values in this interval not displayed.       Recent Labs   Lab 21  0500 21  1112   AST 12 17        Imaging:   CXR  Impression:     Multifocal pneumonia, as above.            Pia Randall PA-C  [facility], [facility]  [phone]  12/22/2021  1:02 PM   yes

## 2024-10-21 NOTE — DISCHARGE NOTE PROVIDER - CARE PROVIDERS DIRECT ADDRESSES
,DirectAddress_Unknown,DirectAddress_Unknown ,DirectAddress_Unknown,DirectAddress_Unknown,[email or web address]

## 2024-10-21 NOTE — DISCHARGE NOTE PROVIDER - HOSPITAL COURSE
HPI:  64 yof well known to me from office w/ hx of PE on AC and compliant, BIBEMS from clinic c/f waxing waning CP. is described as dull pressure in midchest. No fevers, no chills, n/v/d. Given ASA enrout  Of note she had a Nuclear stress test in my office last week      (20 Oct 2024 06:07)    Hospital Course:      Important Medication Changes and Reason:    Active or Pending Issues Requiring Follow-up:    Advanced Directives:   [ ] Full code  [ ] DNR  [ ] Hospice    Discharge Diagnoses:         HPI:  64 yof well known to me from office w/ hx of PE on AC and compliant, BIBEMS from clinic c/f waxing waning CP. is described as dull pressure in midchest. No fevers, no chills, n/v/d. Given ASA enrout  Of note she had a Nuclear stress test in my office last week      (20 Oct 2024 06:07)    Hospital Course: She was started with Ceftriaxone and will complete the course po ABs at home. She was consulted with card and pulm. She is cleared for disc home today. Disp and disc meds were d/w Attending Dr ----       Important Medication Changes and Reason: ABs for PNA    Active or Pending Issues Requiring Follow-up: PCP, Pulm    Advanced Directives:   [ x] Full code  [ ] DNR  [ ] Hospice    Discharge Diagnoses: chest pain, PNA, PE         HPI:  64 yof well known to me from office w/ hx of PE on AC and compliant, BIBEMS from clinic c/f waxing waning CP. is described as dull pressure in midchest. No fevers, no chills, n/v/d. Given ASA enrout  Of note she had a Nuclear stress test in my office last week      (20 Oct 2024 06:07)    Hospital Course:  Pt came with chest pain. Normal SPECT without any ischemia, normal LVEF. symptoms secondary to PNA. Pulm evaluated, pt was on room air and started on ceftriaxone. Pt was started on PPI. Pt complained of pain, imaging was done which showed absent spleen. Pt denied history of splenectomy. Pt states family history of sickle cell, heme was consulted. Hematology recommended for LDH, reticulocyte, and hapto. Pt was started on IVF 1/2 NS. Outpatient f/u with heme. Pt to continue with eliquis for his hx of PE.   F/u with PC.    Important Medication Changes and Reason: See med rec.     Active or Pending Issues Requiring Follow-up:  PCP, hematology, cardiology    Advanced Directives:   [X] Full code  [ ] DNR  [ ] Hospice    Discharge Diagnoses:  Chest pain, PNA, bone pain

## 2024-10-21 NOTE — CONSULT NOTE ADULT - ASSESSMENT
64 yof well known to me from office w/ hx of PE on AC and compliant, BIBEMS from clinic c/f waxing waning CP. is described as dull pressure in midchest. No fevers, no chills, n/v/d. Given ASA enrout  Of note she had a Nuclear stress test in my office last week   she says she is feeling  ok today  : sometimes feels chest tightness:  but have been on regular Eliquis for previous episode of pe last year:  she has no strong family hisotry of pe:  has no underlying malignancy :  not sure if she was ever evaluated by hemonc:     Pulmonary embolism;   -ct ch est showed: No aortic dissection or aortic aneurysm or pulmonary embolism. Bilateral mosaic lung attenuation mostly in the lower lobes. Differential diagnosis includes air trapping, alveolar edema, or groundglass infiltrates. Small patchy bilateral lower lobe opacities could represent developing pneumonia. Gastric wall thickening versusunderdistention, correlate clinically for gastritis. 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 Patchy sclerosis L4, L5 and S1 vertebral bodies. Correlate clinically for history of sickle cell disease.      she has a history of PE last year:  presented with chest pressure:    no pe on this admission : she has been taking eliquis regularly:   she has mosaic attenuation of elodia lower lobes; she has no clinical features of pneumonia:  she did have high WBC on admission which reversed now: on empiric antibiotics  no fever:   -mosaic attenuation  add empiric advair:  would need pft as an otpt   -the ct scan chest suggest splenectomy:  she never had any surgical removal of spleen:  ? auto splenectomy:   -she does have bone pains:  but never be diagnosed with sickle cell disease:  would need lanre pfor i t:   cont ac:     dwa cp

## 2024-10-21 NOTE — DISCHARGE NOTE PROVIDER - CARE PROVIDER_API CALL
Donavon Spears  Cardiology  [address] [place] [address], [address]  [place] [address]  Phone: [phone]  Fax: [phone]  Follow Up Time:     Christiano Nur  Pulmonary Disease  [address] [place] [address]  Phone: [phone]  Fax: [phone]  Follow Up Time:    Donavon Spears  Cardiology  [address] [place] [address], [address]  [place] [address]  Phone: [phone]  Fax: [phone]  Follow Up Time:     Christiano Nur  Pulmonary Disease  [address] [place] [address]  Phone: [phone]  Fax: [phone]  Follow Up Time:     Blair Mike  [address] [address]  Phone: [phone]  Fax: [phone]  Follow Up Time: 2 weeks

## 2024-10-21 NOTE — DISCHARGE NOTE PROVIDER - PROVIDER TOKENS
PROVIDER:[TOKEN:[[id number]]],PROVIDER:[TOKEN:[[id number]]] PROVIDER:[TOKEN:[[id number]]],PROVIDER:[TOKEN:[[id number]]],PROVIDER:[TOKEN:[[id number]],FOLLOWUP:[2 weeks]]

## 2024-10-21 NOTE — DISCHARGE NOTE PROVIDER - NSFOLLOWUPCLINICS_GEN_ALL_ED_FT
[facility]  Hematology/Oncology  [address] [id number]  Phone: [phone]  Fax:

## 2024-10-21 NOTE — DISCHARGE NOTE PROVIDER - NSDCMRMEDTOKEN_GEN_ALL_CORE_FT
apixaban:    apixaban 5 mg oral tablet: 1 tab(s) orally 2 times a day (does not get this filled in a pharmacy, gets samples through her doctor)  fenofibrate 160 mg oral tablet: 1 tab(s) orally once a day  losartan 50 mg oral tablet: 1 tab(s) orally once a day   Advair Diskus 250 mcg-50 mcg inhalation powder: 1 inhaled 2 times a day  apixaban 5 mg oral tablet: 1 tab(s) orally every 12 hours  famotidine 20 mg oral tablet: 1 tab(s) orally once a day  fenofibrate 160 mg oral tablet: 1 tab(s) orally once a day  folic acid 1 mg oral tablet: 1 tab(s) orally once a day  pantoprazole 40 mg oral delayed release tablet: 1 tab(s) orally once a day (before a meal)

## 2024-10-21 NOTE — DISCHARGE NOTE PROVIDER - NSDCCPCAREPLAN_GEN_ALL_CORE_FT
PRINCIPAL DISCHARGE DIAGNOSIS  Diagnosis: Chest pain of uncertain etiology  Assessment and Plan of Treatment: resolved, f/up with card      SECONDARY DISCHARGE DIAGNOSES  Diagnosis: Pulmonary embolism  Assessment and Plan of Treatment: stable, cont Eliquis,    Diagnosis: PNA (pneumonia)  Assessment and Plan of Treatment: stable, complete ABs course per Attending and Pulm     PRINCIPAL DISCHARGE DIAGNOSIS  Diagnosis: Chest pain of uncertain etiology  Assessment and Plan of Treatment: HOME CARE INSTRUCTIONS  For the next few days, avoid physical activities that bring on chest pain. Continue physical activities as directed.  Do not smoke.  Avoid drinking alcohol.   Only take over-the-counter or prescription medicine for pain, discomfort, or fever as directed by your caregiver.  Follow your caregiver's suggestions for further testing if your chest pain does not go away.  Keep any follow-up appointments you made. If you do not go to an appointment, you could develop lasting (chronic) problems with pain. If there is any problem keeping an appointment, you must call to reschedule.   SEEK MEDICAL CARE IF:  You think you are having problems from the medicine you are taking. Read your medicine instructions carefully.  Your chest pain does not go away, even after treatment.  You develop a rash with blisters on your chest.  SEEK IMMEDIATE MEDICAL CARE IF:  You have increased chest pain or pain that spreads to your arm, neck, jaw, back, or abdomen.   You develop shortness of breath, an increasing cough, or you are coughing up blood.  You have severe back or abdominal pain, feel nauseous, or vomit.  You develop severe weakness, fainting, or chills.  You have a fever.  THIS IS AN EMERGENCY. Do not wait to see if the pain will go away. Get medical help at once. Call your local emergency services (_____________________). Do not drive yourself to the hospital.        SECONDARY DISCHARGE DIAGNOSES  Diagnosis: PNA (pneumonia)  Assessment and Plan of Treatment: stable, complete ABs course per Attending and Pulm    Diagnosis: Pulmonary embolism  Assessment and Plan of Treatment: stable, cont Eliquis,     PRINCIPAL DISCHARGE DIAGNOSIS  Diagnosis: Chest pain of uncertain etiology  Assessment and Plan of Treatment: HOME CARE INSTRUCTIONS  For the next few days, avoid physical activities that bring on chest pain. Continue physical activities as directed.  Do not Informed pt of the various negative side effects of smoking including risk of COPD, Lung Ca etc  Strongly recommended that pt stops smoking and pt given various options of smoking cessasion tools such as NRT's and other pharmacotherapies.  Avoid drinking alcohol.   Only take over-the-counter or prescription medicine for pain, discomfort, or fever as directed by your caregiver.  Follow your caregiver's suggestions for further testing if your chest pain does not go away.  Keep any follow-up appointments you made. If you do not go to an appointment, you could develop lasting (chronic) problems with pain. If there is any problem keeping an appointment, you must call to reschedule.   SEEK MEDICAL CARE IF:  You think you are having problems from the medicine you are taking. Read your medicine instructions carefully.  Your chest pain does not go away, even after treatment.  You develop a rash with blisters on your chest.  SEEK IMMEDIATE MEDICAL CARE IF:  You have increased chest pain or pain that spreads to your arm, neck, jaw, back, or abdomen.   You develop shortness of breath, an increasing cough, or you are coughing up blood.  You have severe back or abdominal pain, feel nauseous, or vomit.  You develop severe weakness, fainting, or chills.  You have a fever..        SECONDARY DISCHARGE DIAGNOSES  Diagnosis: PNA (pneumonia)  Assessment and Plan of Treatment: stable, complete ABs course per Attending and Pulm.   continue with advir as directed.    Diagnosis: Pulmonary embolism  Assessment and Plan of Treatment: stable, cont Eliquis.  Take your anticoagulation (lovenox, coumadin) as directed.  Follow up with your health care provider within one week. Call for appointment.  If you develop shortness of breath or if your shortness of breath worsens call your Health Care Provider or go to the Emergency Department.       PRINCIPAL DISCHARGE DIAGNOSIS  Diagnosis: Chest pain of uncertain etiology  Assessment and Plan of Treatment: HOME CARE INSTRUCTIONS  For the next few days, avoid physical activities that bring on chest pain. Continue physical activities as directed.  Do not Informed pt of the various negative side effects of smoking including risk of COPD, Lung Ca etc  Strongly recommended that pt stops smoking and pt given various options of smoking cessasion tools such as NRT's and other pharmacotherapies.  Avoid drinking alcohol.   Only take over-the-counter or prescription medicine for pain, discomfort, or fever as directed by your caregiver.  Follow your caregiver's suggestions for further testing if your chest pain does not go away.  Keep any follow-up appointments you made. If you do not go to an appointment, you could develop lasting (chronic) problems with pain. If there is any problem keeping an appointment, you must call to reschedule.   SEEK MEDICAL CARE IF:  You think you are having problems from the medicine you are taking. Read your medicine instructions carefully.  Your chest pain does not go away, even after treatment.  You develop a rash with blisters on your chest.  SEEK IMMEDIATE MEDICAL CARE IF:  You have increased chest pain or pain that spreads to your arm, neck, jaw, back, or abdomen.   You develop shortness of breath, an increasing cough, or you are coughing up blood.  You have severe back or abdominal pain, feel nauseous, or vomit.  You develop severe weakness, fainting, or chills.  You have a fever..        SECONDARY DISCHARGE DIAGNOSES  Diagnosis: PNA (pneumonia)  Assessment and Plan of Treatment: stable, complete ABs course per Attending and Pulm.   continue with advir as directed.    Diagnosis: Pulmonary embolism  Assessment and Plan of Treatment: stable, cont Eliquis.  Take your anticoagulation (lovenox, coumadin) as directed.  Follow up with your health care provider within one week. Call for appointment.  If you develop shortness of breath or if your shortness of breath worsens call your Health Care Provider or go to the Emergency Department.      Diagnosis: Sickle cell disease  Assessment and Plan of Treatment: found no sleepn. please follow up with hematology for workups.

## 2024-10-22 LAB
HGB A MFR BLD: 0 % — LOW (ref 95.8–98)
HGB A2 MFR BLD: 4.7 % — HIGH (ref 2–3.2)
HGB C MFR BLD: 42.3 % — HIGH
HGB F MFR BLD: 2.2 % — HIGH (ref 0–1)
HGB OTHER MFR BLD ELPH: 0 % — SIGNIFICANT CHANGE UP
HGB S MFR BLD: 50.8 % — HIGH
SICKLE CELL DISEASE SCREENING TEST: POSITIVE

## 2024-10-22 RX ADMIN — APIXABAN 5 MILLIGRAM(S): 5 TABLET, FILM COATED ORAL at 10:04

## 2024-10-22 RX ADMIN — FLUTICASONE PROPIONATE AND SALMETEROL XINAFOATE 1 DOSE(S): 230; 21 AEROSOL, METERED RESPIRATORY (INHALATION) at 17:32

## 2024-10-22 RX ADMIN — PANTOPRAZOLE SODIUM 40 MILLIGRAM(S): 40 TABLET, DELAYED RELEASE ORAL at 06:00

## 2024-10-22 RX ADMIN — Medication 100 MILLIGRAM(S): at 06:01

## 2024-10-22 RX ADMIN — FAMOTIDINE 20 MILLIGRAM(S): 10 INJECTION INTRAVENOUS at 12:03

## 2024-10-22 RX ADMIN — FLUTICASONE PROPIONATE AND SALMETEROL XINAFOATE 1 DOSE(S): 230; 21 AEROSOL, METERED RESPIRATORY (INHALATION) at 06:01

## 2024-10-22 RX ADMIN — APIXABAN 5 MILLIGRAM(S): 5 TABLET, FILM COATED ORAL at 21:09

## 2024-10-22 NOTE — PHARMACOTHERAPY INTERVENTION NOTE - COMMENTS
Performed medication reconciliation and home medication list updated in prescription writer/ outpatient medication review. Medications verified with patient and pharmacy.     Home medications:  apixaban 5 mg oral tablet: 1 tab(s) orally 2 times a day (does not get this filled in a pharmacy, gets samples through her doctor)  fenofibrate 160 mg oral tablet: 1 tab(s) orally once a day  losartan 50 mg oral tablet: 1 tab(s) orally once a day    Recommendations: Continue home medications if needed    Jacey Ingram, PharmD, BCPS, BCGP  Transitions of care Pharmacist  Available on Teams (preferred)

## 2024-10-23 ENCOUNTER — TRANSCRIPTION ENCOUNTER (OUTPATIENT)
Age: 64
End: 2024-10-23

## 2024-10-23 LAB
HAPTOGLOB SERPL-MCNC: <20 MG/DL — LOW (ref 34–200)
LDH SERPL L TO P-CCNC: 186 U/L — SIGNIFICANT CHANGE UP (ref 50–242)
RBC # BLD: 4.2 M/UL — SIGNIFICANT CHANGE UP (ref 3.8–5.2)
RETICS #: 129.4 K/UL — HIGH (ref 25–125)
RETICS/RBC NFR: 3.1 % — HIGH (ref 0.5–2.5)

## 2024-10-23 PROCEDURE — 99223 1ST HOSP IP/OBS HIGH 75: CPT | Mod: GC

## 2024-10-23 RX ORDER — FOLIC ACID 1 MG/1
1 TABLET ORAL DAILY
Refills: 0 | Status: DISCONTINUED | OUTPATIENT
Start: 2024-10-23 | End: 2024-10-24

## 2024-10-23 RX ADMIN — APIXABAN 5 MILLIGRAM(S): 5 TABLET, FILM COATED ORAL at 21:48

## 2024-10-23 RX ADMIN — FLUTICASONE PROPIONATE AND SALMETEROL XINAFOATE 1 DOSE(S): 230; 21 AEROSOL, METERED RESPIRATORY (INHALATION) at 05:27

## 2024-10-23 RX ADMIN — PANTOPRAZOLE SODIUM 40 MILLIGRAM(S): 40 TABLET, DELAYED RELEASE ORAL at 05:28

## 2024-10-23 RX ADMIN — FAMOTIDINE 20 MILLIGRAM(S): 10 INJECTION INTRAVENOUS at 09:18

## 2024-10-23 RX ADMIN — Medication 50 MILLILITER(S): at 13:01

## 2024-10-23 RX ADMIN — FLUTICASONE PROPIONATE AND SALMETEROL XINAFOATE 1 DOSE(S): 230; 21 AEROSOL, METERED RESPIRATORY (INHALATION) at 18:37

## 2024-10-23 RX ADMIN — APIXABAN 5 MILLIGRAM(S): 5 TABLET, FILM COATED ORAL at 09:18

## 2024-10-23 RX ADMIN — Medication 100 MILLIGRAM(S): at 05:29

## 2024-10-23 NOTE — DISCHARGE NOTE NURSING/CASE MANAGEMENT/SOCIAL WORK - FINANCIAL ASSISTANCE
[facility] provides services at a reduced cost to those who are determined to be eligible through [facility]’s financial assistance program. Information regarding [facility]’s financial assistance program can be found by going to [email or web address] or by calling [phone].

## 2024-10-23 NOTE — DISCHARGE NOTE NURSING/CASE MANAGEMENT/SOCIAL WORK - PATIENT PORTAL LINK FT
You can access the FollowMyHealth Patient Portal offered by [facility] by registering at the following website: [email or web address]. By joining [facility]’s FollowMyHealth portal, you will also be able to view your health information using other applications (apps) compatible with our system.

## 2024-10-23 NOTE — CONSULT NOTE ADULT - SUBJECTIVE AND OBJECTIVE BOX
10-21-24 @ 14:48    Patient is a 64y old  Female who presents with a chief complaint of sob (21 Oct 2024 09:34)      HPI:  64 yof well known to me from office w/ hx of PE on AC and compliant, BIBEMS from clinic c/f waxing waning CP. is described as dull pressure in midchest. No fevers, no chills, n/v/d. Given ASA enrout  Of note she had a Nuclear stress test in my office last week      (20 Oct 2024 06:07)  she says she is feeling  ok today  : sometimes feels chest tightness:  but have been on regular Eliquis for previous episode of pe last year:  she has no strong family hisotry of pe:  has no underlying malignancy :  not sure if she was ever evaluated by hemonc:       ?FOLLOWING PRESENT  [ y] Hx of PE/DVT, [ z] Hx COPD, [z ] Hx of Asthma, [z ] Hx of Hospitalization, [ z]  Hx of BiPAP/CPAP use, [ z] Hx of MIS    Allergies    No Known Allergies    Intolerances        PAST MEDICAL & SURGICAL HISTORY:  Pleural effusion      Pulmonary embolus      No significant past surgical history          FAMILY HISTORY:  No pertinent family history in first degree relatives        Social History: [ yes > 30 yrs ] TOBACCO                  [  z] ETOH                                 [ x ] IVDA/DRUGS    REVIEW OF SYSTEMS      General:	x    Skin/Breast:x  	  Ophthalmologic:x  	  ENMT:	x    Respiratory and Thorax:  chest tightness  	  Cardiovascular:	x    Gastrointestinal:	x    Genitourinary:	x    Musculoskeletal:	x    Neurological:	x    Psychiatric:	x    Hematology/Lymphatics:	x    Endocrine:	x    Allergic/Immunologic:	x    MEDICATIONS  (STANDING):  apixaban 5 milliGRAM(s) Oral every 12 hours  cefTRIAXone   IVPB 1000 milliGRAM(s) IV Intermittent every 24 hours  famotidine    Tablet 20 milliGRAM(s) Oral daily  influenza  Vaccine (HIGH DOSE) 0.5 milliLiter(s) IntraMuscular once  pantoprazole    Tablet 40 milliGRAM(s) Oral before breakfast    MEDICATIONS  (PRN):       Vital Signs Last 24 Hrs  T(C): 36.8 (21 Oct 2024 10:02), Max: 36.8 (20 Oct 2024 14:58)  T(F): 98.3 (21 Oct 2024 10:02), Max: 98.3 (20 Oct 2024 20:24)  HR: 67 (21 Oct 2024 10:02) (64 - 74)  BP: 113/74 (21 Oct 2024 10:02) (107/69 - 119/78)  BP(mean): --  RR: 19 (21 Oct 2024 10:02) (18 - 19)  SpO2: 97% (21 Oct 2024 10:02) (97% - 98%)    Parameters below as of 21 Oct 2024 10:02  Patient On (Oxygen Delivery Method): room air    Orthostatic VS          I&O's Summary    21 Oct 2024 07:01  -  21 Oct 2024 14:48  --------------------------------------------------------  IN: 240 mL / OUT: 0 mL / NET: 240 mL        Physical Exam:   GENERAL: NAD, well-groomed, well-developed  HEENT: MITCHELL/   Atraumatic, Normocephalic  ENMT: No tonsillar erythema, exudates, or enlargement; Moist mucous membranes, Good dentition, No lesions  NECK: Supple, No JVD, Normal thyroid  CHEST/LUNG: Clear to auscultation bilaterally  CVS: Regular rate and rhythm; No murmurs, rubs, or gallops  GI: : Soft, Nontender, Nondistended; Bowel sounds present  NERVOUS SYSTEM:  Alert & Oriented X3  EXTREMITIES:-edema  LYMPH: No lymphadenopathy noted  SKIN: No rashes or lesions  ENDOCRINOLOGY: No Thyromegaly  PSYCH: Appropriate    Labs:  SARS-CoV-2: NotDetec (20 Oct 2024 06:41)                              12.0   9.43  )-----------( 254      ( 20 Oct 2024 04:45 )             34.1                         11.5   11.39 )-----------( 346      ( 19 Oct 2024 17:28 )             31.3     10-20    138  |  105  |  14  ----------------------------<  98  4.2   |  17[L]  |  0.92  10-19    141  |  107  |  18  ----------------------------<  105[H]  4.6   |  21[L]  |  1.00    Ca    9.5      20 Oct 2024 04:45  Ca    10.1      19 Oct 2024 17:28  Phos  3.8     10-19  Mg     2.3     10-19    TPro  7.4  /  Alb  3.8  /  TBili  0.7  /  DBili  x   /  AST  31  /  ALT  20  /  AlkPhos  53  10-20  TPro  8.0  /  Alb  4.4  /  TBili  0.7  /  DBili  x   /  AST  37  /  ALT  21  /  AlkPhos  59  10-19    CAPILLARY BLOOD GLUCOSE        LIVER FUNCTIONS - ( 20 Oct 2024 04:45 )  Alb: 3.8 g/dL / Pro: 7.4 g/dL / ALK PHOS: 53 U/L / ALT: 20 U/L / AST: 31 U/L / GGT: x             Urinalysis Basic - ( 20 Oct 2024 04:45 )    Color: x / Appearance: x / SG: x / pH: x  Gluc: 98 mg/dL / Ketone: x  / Bili: x / Urobili: x   Blood: x / Protein: x / Nitrite: x   Leuk Esterase: x / RBC: x / WBC x   Sq Epi: x / Non Sq Epi: x / Bacteria: x      D DImer      Studies  Chest X-RAY  CT SCAN Chest   CT Abdomen  Venous Dopplers: LE:   Others      rad< from: CT Angio Chest Aorta w/wo IV Cont (10.19.24 @ 18:12) >  sclerosis. Mild degenerative arthritis of the left hip. Degenerative   spine changes worse at L4-5. Patchy appearance throughout the vertebral   bodies. Patchy sclerosis L4, L5 and S1 vertebral bodies. Degenerative   arthritis of both glenohumeral joints with subchondral cysts.    IMPRESSION:  No aortic dissection or aortic aneurysm or pulmonary embolism.    Bilateral mosaic lungattenuation mostly in the lower lobes. Differential   diagnosis includes air trapping, alveolar edema, or groundglass   infiltrates. Small patchy bilateral lower lobe opacities could represent   developing pneumonia.    Gastric wall thickening versusunderdistention, correlate clinically for   gastritis.    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 Patchy sclerosis   L4, L5 and S1 vertebral bodies. Correlate clinically for history of   sickle cell disease.    Additional findings noted above.    --- End of Report ---            MARIA DOLORES JEONG MD; Attending Radiologist  This document has been electronically signed. Oct 19 2024  6:38PM    < end of copied text >              
Hematology Consult Note    HPI as per admitting team:   64 yof well known to me from office w/ hx of PE on AC and compliant, BIBEMS from clinic c/f waxing waning CP. is described as dull pressure in midchest. No fevers, no chills, n/v/d. Given ASA enrout  Of note she had a Nuclear stress test in my office last week      (20 Oct 2024 06:07)        REVIEW OF SYSTEMS:    CONSTITUTIONAL: No weakness, fevers or chills  EYES/ENT: No visual changes;  No vertigo or throat pain   NECK: No pain or stiffness  RESPIRATORY: No cough, wheezing, hemoptysis; No shortness of breath  CARDIOVASCULAR: No chest pain or palpitations  GASTROINTESTINAL: No abdominal or epigastric pain. No nausea, vomiting, or hematemesis; No diarrhea or constipation. No melena or hematochezia.  GENITOURINARY: No dysuria, frequency or hematuria  NEUROLOGICAL: No numbness or weakness  SKIN: No itching, burning, rashes, or lesions   All other review of systems is negative unless indicated above.    PAST MEDICAL & SURGICAL HISTORY:  Pleural effusion      Pulmonary embolus      No significant past surgical history          FAMILY HISTORY:  No pertinent family history in first degree relatives        SOCIAL HISTORY:     Allergies    No Known Allergies    Intolerances        MEDICATIONS  (STANDING):  apixaban 5 milliGRAM(s) Oral every 12 hours  cefTRIAXone   IVPB 1000 milliGRAM(s) IV Intermittent every 24 hours  famotidine    Tablet 20 milliGRAM(s) Oral daily  fluticasone propionate/ salmeterol 250-50 MICROgram(s) Diskus 1 Dose(s) Inhalation two times a day  influenza  Vaccine (HIGH DOSE) 0.5 milliLiter(s) IntraMuscular once  pantoprazole    Tablet 40 milliGRAM(s) Oral before breakfast    MEDICATIONS  (PRN):      OBJECTIVE       T(F): 98.1 (10-23-24 @ 04:00), Max: 99 (10-22-24 @ 20:21)  HR: 82 (10-23-24 @ 04:00)  BP: 136/84 (10-23-24 @ 04:00)  RR: 18 (10-23-24 @ 04:00)  SpO2: 99% (10-23-24 @ 04:00)  Wt(kg): --    PHYSICAL EXAM   GENERAL: NAD, well-developed  HEAD:  Atraumatic, Normocephalic  EYES: EOMI, PERRLA, conjunctiva and sclera clear  NECK: Supple, No JVD  CHEST/LUNG: Clear to auscultation bilaterally; No wheeze  HEART: Regular rate and rhythm; No murmurs, rubs, or gallops  ABDOMEN: Soft, Nontender, Nondistended; Bowel sounds present  EXTREMITIES:  2+ Peripheral Pulses, No clubbing, cyanosis, or edema  NEUROLOGY: non-focal  SKIN: No rashes or lesions        RADIOLOGY:    < from: CT Angio Chest Aorta w/wo IV Cont (10.19.24 @ 18:12) >  IMPRESSION:  No aortic dissection or aortic aneurysm or pulmonary embolism.    Bilateral mosaic lungattenuation mostly in the lower lobes. Differential   diagnosis includes air trapping, alveolar edema, or groundglass   infiltrates. Small patchy bilateral lower lobe opacities could represent   developing pneumonia.    Gastric wall thickening versusunderdistention, correlate clinically for   gastritis.    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 Patchy sclerosis   L4, L5 and S1 vertebral bodies. Correlate clinically for history of   sickle cell disease.    Additional findings noted above.    --- End of Report ---    < end of copied text >        
  10-21-24 @ 09:34    Patient is a 64y old  Female who presents with a chief complaint of     HPI:  64 yof well known to me from office w/ hx of PE on AC and compliant, BIBEMS from clinic c/f waxing waning CP. is described as dull pressure in midchest. No fevers, no chills, n/v/d. Given ASA enrout  Of note she had a Nuclear stress test in my office last week      (20 Oct 2024 06:07)      ?FOLLOWING PRESENT  [ ] Hx of PE/DVT, [ ] Hx COPD, [ ] Hx of Asthma, [ ] Hx of Hospitalization, [ ]  Hx of BiPAP/CPAP use, [ ] Hx of MIS    Allergies    Allergy Status Unknown    Intolerances        PAST MEDICAL & SURGICAL HISTORY:  Pleural effusion      Pulmonary embolus      No significant past surgical history          FAMILY HISTORY:  No pertinent family history in first degree relatives        Social History: [  ] TOBACCO                  [  ] ETOH                                 [  ] IVDA/DRUGS    REVIEW OF SYSTEMS      General:	    Skin/Breast:  	  Ophthalmologic:  	  ENMT:	    Respiratory and Thorax:  	  Cardiovascular:	    Gastrointestinal:	    Genitourinary:	    Musculoskeletal:	    Neurological:	    Psychiatric:	    Hematology/Lymphatics:	    Endocrine:	    Allergic/Immunologic:	    MEDICATIONS  (STANDING):  apixaban 5 milliGRAM(s) Oral every 12 hours  cefTRIAXone   IVPB 1000 milliGRAM(s) IV Intermittent every 24 hours  influenza  Vaccine (HIGH DOSE) 0.5 milliLiter(s) IntraMuscular once    MEDICATIONS  (PRN):       Vital Signs Last 24 Hrs  T(C): 36.7 (21 Oct 2024 05:13), Max: 36.8 (20 Oct 2024 14:58)  T(F): 98.1 (21 Oct 2024 05:13), Max: 98.3 (20 Oct 2024 20:24)  HR: 64 (21 Oct 2024 05:13) (64 - 74)  BP: 107/69 (21 Oct 2024 05:13) (107/69 - 119/78)  BP(mean): --  RR: 18 (21 Oct 2024 05:13) (18 - 19)  SpO2: 98% (21 Oct 2024 05:13) (97% - 98%)    Parameters below as of 21 Oct 2024 05:13  Patient On (Oxygen Delivery Method): room air    Orthostatic VS          I&O's Summary      Physical Exam:   GENERAL: NAD, well-groomed, well-developed  HEENT: MITCHELL/   Atraumatic, Normocephalic  ENMT: No tonsillar erythema, exudates, or enlargement; Moist mucous membranes, Good dentition, No lesions  NECK: Supple, No JVD, Normal thyroid  CHEST/LUNG: Clear to auscultation bilaterally; No rales, rhonchi, wheezing, or rubs  CVS: Regular rate and rhythm; No murmurs, rubs, or gallops  GI: : Soft, Nontender, Nondistended; Bowel sounds present  NERVOUS SYSTEM:  Alert & Oriented X3, Good concentration; Motor Strength 5/5 B/L upper and lower extremities; DTRs 2+ intact and symmetric  EXTREMITIES:  2+ Peripheral Pulses, No clubbing, cyanosis, or edema  LYMPH: No lymphadenopathy noted  SKIN: No rashes or lesions  ENDOCRINOLOGY: No Thyromegaly  PSYCH: Appropriate    Labs:  SARS-CoV-2: NotDetec (20 Oct 2024 06:41)                              12.0   9.43  )-----------( 254      ( 20 Oct 2024 04:45 )             34.1                         11.5   11.39 )-----------( 346      ( 19 Oct 2024 17:28 )             31.3     10-20    138  |  105  |  14  ----------------------------<  98  4.2   |  17[L]  |  0.92  10-19    141  |  107  |  18  ----------------------------<  105[H]  4.6   |  21[L]  |  1.00    Ca    9.5      20 Oct 2024 04:45  Ca    10.1      19 Oct 2024 17:28  Phos  3.8     10-19  Mg     2.3     10-19    TPro  7.4  /  Alb  3.8  /  TBili  0.7  /  DBili  x   /  AST  31  /  ALT  20  /  AlkPhos  53  10-20  TPro  8.0  /  Alb  4.4  /  TBili  0.7  /  DBili  x   /  AST  37  /  ALT  21  /  AlkPhos  59  10-19    CAPILLARY BLOOD GLUCOSE        LIVER FUNCTIONS - ( 20 Oct 2024 04:45 )  Alb: 3.8 g/dL / Pro: 7.4 g/dL / ALK PHOS: 53 U/L / ALT: 20 U/L / AST: 31 U/L / GGT: x             Urinalysis Basic - ( 20 Oct 2024 04:45 )    Color: x / Appearance: x / SG: x / pH: x  Gluc: 98 mg/dL / Ketone: x  / Bili: x / Urobili: x   Blood: x / Protein: x / Nitrite: x   Leuk Esterase: x / RBC: x / WBC x   Sq Epi: x / Non Sq Epi: x / Bacteria: x      D DImer      Studies  Chest X-RAY  CT SCAN Chest   CT Abdomen  Venous Dopplers: LE:   Others                  
CHIEF COMPLAINT:    HISTORY OF PRESENT ILLNESS:  64 yof well known to me from office w/ hx of PE on AC and compliant, BIBEMS from clinic c/f waxing waning CP. is described as dull pressure in midchest. No fevers, no chills, n/v/d. Given ASA enrout  Of note she had a Nuclear stress test in my office last week     PAST MEDICAL & SURGICAL HISTORY:  Pleural effusion      No significant past surgical history              MEDICATIONS:  apixaban 5 milliGRAM(s) Oral every 12 hours                  FAMILY HISTORY:  No pertinent family history in first degree relatives        SOCIAL HISTORY:    [ ] Non-smoker  [ ] Smoker  [ ] Alcohol    Allergies    No Known Allergies    Intolerances    	    REVIEW OF SYSTEMS:  CONSTITUTIONAL: No fever, weight loss, o+ fatigue  EYES: No eye pain, visual disturbances, or discharge  ENMT:  No difficulty hearing, tinnitus, vertigo; No sinus or throat pain  NECK: No pain or stiffness  RESPIRATORY: No cough, wheezing, chills or hemoptysis; No Shortness of Breath  CARDIOVASCULAR: No chest pain, palpitations, passing out, dizziness, or leg swelling  GASTROINTESTINAL: No abdominal or epigastric pain. No nausea, vomiting, or hematemesis; No diarrhea or constipation. No melena or hematochezia.  GENITOURINARY: No dysuria, frequency, hematuria, or incontinence  NEUROLOGICAL: No headaches, memory loss, loss of strength, numbness, or tremors  SKIN: No itching, burning, rashes, or lesions   LYMPH Nodes: No enlarged glands  ENDOCRINE: No heat or cold intolerance; No hair loss  MUSCULOSKELETAL: No joint pain or swelling; No muscle, back, or extremity pain  PSYCHIATRIC: No depression, anxiety, mood swings, or difficulty sleeping  HEME/LYMPH: No easy bruising, or bleeding gums  ALLERY AND IMMUNOLOGIC: No hives or eczema	    [ ] All others negative	  [ ] Unable to obtain    PHYSICAL EXAM:  T(C): 36.7 (10-20-24 @ 04:19), Max: 36.9 (10-19-24 @ 16:49)  HR: 65 (10-20-24 @ 04:19) (65 - 82)  BP: 126/81 (10-20-24 @ 04:19) (109/68 - 144/87)  RR: 18 (10-20-24 @ 04:19) (16 - 18)  SpO2: 98% (10-20-24 @ 04:19) (97% - 98%)  Wt(kg): --  I&O's Summary      Appearance: Normal	  HEENT:   Normal oral mucosa, PERRL, EOMI	  Lymphatic: No lymphadenopathy  Cardiovascular: Normal S1 S2, No JVD, No murmurs, No edema  Respiratory: Lungs clear to auscultation	  Psychiatry: A & O x 3, Mood & affect appropriate  Gastrointestinal:  Soft, Non-tender, + BS	  Skin: No rashes, No ecchymoses, No cyanosis	  Neurologic: Non-focal  Extremities: Normal range of motion, No clubbing, cyanosis or edema  Vascular: Peripheral pulses palpable 2+ bilaterally    TELEMETRY: SR	    ECG:  	  RADIOLOGY:  < from: CT Angio Chest Aorta w/wo IV Cont (10.19.24 @ 18:12) >    ACC: [id number] EXAM:  CT ANGIO CHEST AORTA WAWIC   ORDERED BY: CHLOE CARDENAS     ACC: [id number] EXAM:  CT ANGIO ABD PELV (W)AW IC   ORDERED BY: CHLOE CARDENAS     PROCEDURE DATE:  10/19/2024          INTERPRETATION:  CLINICAL INFORMATION: Evaluate aorta. Chest and   abdominal pain.    COMPARISON: None.    CONTRAST/COMPLICATIONS:  IV Contrast: Omnipaque 350  90 cc administered   10 cc discarded  Oral Contrast: NONE  Complications: None reported at time of study completion    PROCEDURE:  CT Angiography of the Chest, Abdomen and Pelvis.  Precontrast imaging was performed through the chest followed by arterial   phase imaging of the chest, abdomen and pelvis.  Sagittal and coronal reformats were performed as well as 3D (MIP)   reconstructions.    FINDINGS:  CHEST:  LUNGS AND LARGE AIRWAYS: Patent central airways. Mosaic lung attenuation   in both lungs more prominent in the lower lobes. Small patchy bilateral   lower lobe opacities .  PLEURA: No pleural effusion.  VESSELS: No thoracic aorticdissection, thoracic aortic aneurysm. No   pulmonary embolism up to the segmental pulmonary arteries.  HEART: Heart size is normal. No pericardial effusion.  MEDIASTINUM AND HALLEY: No lymphadenopathy.  CHEST WALL AND LOWER NECK: Bilateral breast calcifications.    ABDOMEN AND PELVIS:  LIVER: Within normal limits.  BILE DUCTS: Normal caliber.  GALLBLADDER: Within normal limits.  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  PANCREAS: Within normal limits.  ADRENALS: Slightly thickened adrenal glands.  KIDNEYS/URETERS: Within normal limits.    BLADDER: Within normal limits.  REPRODUCTIVE ORGANS: No adnexal masses seen.    BOWEL: No bowel obstruction. Appendix is normal. Gastric wall thickening   versus underdistention.  PERITONEUM/RETROPERITONEUM: Within normal limits.  VESSELS: No abdominal aortic aneurysm or abdominal aortic dissection.   Patent abdominal aortic and bilateral iliac branches with no significant   stenosis.  LYMPH NODES: 1.2 cm likely enlarged left inguinal lymph node. Bilateral   normal size/subcentimeter inguinal lymph nodes.  ABDOMINAL WALL: Within normal limits.  BONES: Avascular necrosis/bone infarct in the right femoral head. Severe   degenerative arthritis of the right hip including subchondral cysts and   sclerosis. Mild degenerative arthritis of the left hip. Degenerative   spine changes worse at L4-5. Patchy appearance throughout the vertebral   bodies. Patchy sclerosis L4, L5 and S1 vertebral bodies. Degenerative   arthritis of both glenohumeral joints with subchondral cysts.    IMPRESSION:  No aortic dissection or aortic aneurysm or pulmonary embolism.    Bilateral mosaic lungattenuation mostly in the lower lobes. Differential   diagnosis includes air trapping, alveolar edema, or groundglass   infiltrates. Small patchy bilateral lower lobe opacities could represent   developing pneumonia.    Gastric wall thickening versusunderdistention, correlate clinically for   gastritis.    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 Patchy sclerosis   L4, L5 and S1 vertebral bodies. Correlate clinically for history of   sickle cell disease.    Additional findings noted above.    --- End of Report ---            MARIA DOLORES JEONG MD; Attending Radiologist  This document has been electronically signed. Oct 19 2024  6:38PM    < end of copied text >    OTHER: 	  	  LABS:	 	    CARDIAC MARKERS:      Troponin T, High Sensitivity Result: <6: * Troponin T, High Sensitivity Result: <6: *                             12.0   9.43  )-----------( 254      ( 20 Oct 2024 04:45 )             34.1     10-20    138  |  105  |  14  ----------------------------<  98  4.2   |  17[L]  |  0.92    Ca    9.5      20 Oct 2024 04:45  Phos  3.8     10-19  Mg     2.3     10-19    TPro  7.4  /  Alb  3.8  /  TBili  0.7  /  DBili  x   /  AST  31  /  ALT  20  /  AlkPhos  53  10-20    proBNP:   Lipid Profile:   HgA1c:   TSH:

## 2024-10-23 NOTE — DISCHARGE NOTE NURSING/CASE MANAGEMENT/SOCIAL WORK - [facility]APPT_GEN_ALL_CORE_FT
APPTS ARE READY TO BE MADE: [X] YES    Best Family or Patient Contact (if needed):     Additional Information about above appointments (if needed):    1: PCP		  2: Cards  3: Heme/Onc    Other comments or requests:

## 2024-10-23 NOTE — CONSULT NOTE ADULT - ASSESSMENT
64 year old female with Hx of PE on AC, BIBEMS from clinic for chest pain likely 2/2 to PNA. 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    #New diagnosis Hemoglobin SC disease (autosplenectomy, avascular necrosis)   #PE on Eliquis   #Chest pain 2/2 to PNA  - Hemoglobin electrophoresis 10/21/2024:    Hemoglobin A%: 0.0 %   Hemoglobin A2%: 4.7 %   Hemoglobin F%: 2.2 %   Hemoglobin S%: 50.8: For quantitative assessment of RBC transfusion treatment. %   Hemoglobin C%: 42.3 %   Hemoglobin Variant 1%: 0.0 %  CTA C/A/P: No aortic dissection or aortic aneurysm or pulmonary embolism. Bilateral mosaic lung attenuation mostly in the lower lobes. Gastric wall thickening versus underdistention, correlate clinically for gastritis. 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 Patchy sclerosis L4, L5 and S1 vertebral bodies.  - Hb 12, bili 0.7   - Currently hemodynamically stable and afebrile.       Recommendations:   - Recommend obtain LDH, hapto, reticulocyte count   - Recommend gentle IV hydration with 1/2 NS   - Recommend starting folic acid  - Encourage intensive spirometry use   - Will review peripheral smear   - Would avoid simple or exchange transfusion at this time; not indicated.   - Ensure that patients are up to date on vaccinations (e.g., pneumococcal, meningococcal, and influenza vaccines)  - In general HbSC disease is found to be more milder and diagnosed at a later age than HbSS disease, patients can still present with mild hemolytic anemia, avascular necrosis, autosplenectomy retinopathy; at this time given no previous pain crisis would hold off on starting hydrea; if patient develops further pain crisis to consider start hydroxyurea.   - Pain control and abx per primary team   - Will arrange for outpatient follow up with hematologist at [facility] on discharge.     Note not finalized until signed by attending.   Please do not hesitate to page with questions.     Olga See MD  Hematology/Oncology Fellow PGY4  Available on [employer] Teams   Pager: [phone]  For weekends and evenings (5 pm - 8 am), please page fellow on call.    64 year old female with Hx of PE on AC, BIBEMS from clinic for chest pain likely 2/2 to PNA. 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    #New diagnosis Hemoglobin SC disease (autosplenectomy, avascular necrosis)   #PE on Eliquis   #Chest pain 2/2 to PNA  - Hemoglobin electrophoresis 10/21/2024:    Hemoglobin A%: 0.0 %   Hemoglobin A2%: 4.7 %   Hemoglobin F%: 2.2 %   Hemoglobin S%: 50.8: For quantitative assessment of RBC transfusion treatment. %   Hemoglobin C%: 42.3 %   Hemoglobin Variant 1%: 0.0 %  CTA C/A/P: No aortic dissection or aortic aneurysm or pulmonary embolism. Bilateral mosaic lung attenuation mostly in the lower lobes. Gastric wall thickening versus underdistention, correlate clinically for gastritis. 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 Patchy sclerosis L4, L5 and S1 vertebral bodies.  - Hb 12, bili 0.7   - Currently hemodynamically stable and afebrile.     Recommendations:   - Recommend obtain LDH, hapto, reticulocyte count   - Recommend gentle IV hydration with 1/2 NS   - Recommend starting folic acid  - Encourage intensive spirometry use   - Peripheral smear not available given last cbc from 10/21 and patient pending discharge can review outpatient   - Would avoid simple or exchange transfusion at this time; not indicated.   - Ensure that patients are up to date on vaccinations (e.g., pneumococcal, meningococcal, and influenza vaccines)  - In general HbSC disease is found to be more milder and diagnosed at a later age than HbSS disease, patients can still present with mild hemolytic anemia, avascular necrosis, autosplenectomy retinopathy; at this time given no previous pain crisis would hold off on starting hydrea; if patient develops further pain crisis to consider start hydroxyurea.   - C/w xarelto as prescribed, follow up with pcp and pulm prn   - Pain control and abx per primary team   - Will arrange for outpatient follow up with hematologist at [facility] on discharge.     Note not finalized until signed by attending.   Please do not hesitate to page with questions.     Olga See MD  Hematology/Oncology Fellow PGY4  Available on [employer] Teams   Pager: [phone]  For weekends and evenings (5 pm - 8 am), please page fellow on call.

## 2024-10-23 NOTE — CONSULT NOTE ADULT - ATTENDING COMMENTS
Patient has SC disease and she is stable while on anticoagulation post pulmonary embolism. I agree with the plan of anticoagulation as discussed above. DVT may be related to hemoglobinopathy and we will assess for anticardiolipin antibody status.

## 2024-10-23 NOTE — PROGRESS NOTE ADULT - PROBLEM SELECTOR PLAN 1
mainly non cardiac features   Normal SPECT without any ischemia last week in my office   has normal LVEF  symptoms likely secondary to PNA  Pulm eval appreciated  PPI  Heme consulted for SC workup
mainly non cardiac features   Normal SPECT without any ischemia last week in my office   has normal LVEF  symptoms likely secondary to PNA  Pulm eval   add PPI
mainly non cardiac features   Normal SPECT without any ischemia last week in my office   has normal LVEF  symptoms likely secondary to PNA  Pulm eval appreciated  PPI  Heme consulted++ SC, Heme follow up

## 2024-10-24 VITALS
SYSTOLIC BLOOD PRESSURE: 129 MMHG | HEART RATE: 81 BPM | DIASTOLIC BLOOD PRESSURE: 81 MMHG | OXYGEN SATURATION: 99 % | TEMPERATURE: 98 F | RESPIRATION RATE: 18 BRPM

## 2024-10-24 RX ORDER — PANTOPRAZOLE SODIUM 40 MG/1
1 TABLET, DELAYED RELEASE ORAL
Qty: 30 | Refills: 0
Start: 2024-10-24 | End: 2024-11-22

## 2024-10-24 RX ORDER — FOLIC ACID 1 MG/1
1 TABLET ORAL
Qty: 30 | Refills: 0
Start: 2024-10-24 | End: 2024-11-22

## 2024-10-24 RX ORDER — FLUTICASONE PROPIONATE AND SALMETEROL XINAFOATE 230; 21 UG/1; UG/1
1 AEROSOL, METERED RESPIRATORY (INHALATION)
Qty: 1 | Refills: 0
Start: 2024-10-24 | End: 2024-11-22

## 2024-10-24 RX ORDER — FAMOTIDINE 10 MG/ML
1 INJECTION INTRAVENOUS
Qty: 30 | Refills: 0
Start: 2024-10-24 | End: 2024-11-22

## 2024-10-24 RX ORDER — APIXABAN 5 MG/1
1 TABLET, FILM COATED ORAL
Qty: 0 | Refills: 0 | DISCHARGE
Start: 2024-10-24

## 2024-10-24 RX ADMIN — FAMOTIDINE 20 MILLIGRAM(S): 10 INJECTION INTRAVENOUS at 11:41

## 2024-10-24 RX ADMIN — APIXABAN 5 MILLIGRAM(S): 5 TABLET, FILM COATED ORAL at 11:41

## 2024-10-24 RX ADMIN — PANTOPRAZOLE SODIUM 40 MILLIGRAM(S): 40 TABLET, DELAYED RELEASE ORAL at 05:28

## 2024-10-24 RX ADMIN — Medication 100 MILLIGRAM(S): at 05:29

## 2024-10-24 RX ADMIN — FLUTICASONE PROPIONATE AND SALMETEROL XINAFOATE 1 DOSE(S): 230; 21 AEROSOL, METERED RESPIRATORY (INHALATION) at 16:59

## 2024-10-24 RX ADMIN — FLUTICASONE PROPIONATE AND SALMETEROL XINAFOATE 1 DOSE(S): 230; 21 AEROSOL, METERED RESPIRATORY (INHALATION) at 05:28

## 2024-10-24 RX ADMIN — FOLIC ACID 1 MILLIGRAM(S): 1 TABLET ORAL at 11:41

## 2024-10-24 NOTE — PROGRESS NOTE ADULT - SUBJECTIVE AND OBJECTIVE BOX
Subjective: Patient seen and examined. No new events except as noted.     REVIEW OF SYSTEMS:    CONSTITUTIONAL: + weakness, fevers or chills  EYES/ENT: No visual changes;  No vertigo or throat pain   NECK: No pain or stiffness  RESPIRATORY: No cough, wheezing, hemoptysis; No shortness of breath  CARDIOVASCULAR: No chest pain or palpitations  GASTROINTESTINAL: No abdominal or epigastric pain. No nausea, vomiting, or hematemesis; No diarrhea or constipation. No melena or hematochezia.  GENITOURINARY: No dysuria, frequency or hematuria  NEUROLOGICAL: No numbness or weakness  SKIN: No itching, burning, rashes, or lesions   All other review of systems is negative unless indicated above.    MEDICATIONS:  MEDICATIONS  (STANDING):  apixaban 5 milliGRAM(s) Oral every 12 hours  cefTRIAXone   IVPB 1000 milliGRAM(s) IV Intermittent every 24 hours  influenza  Vaccine (HIGH DOSE) 0.5 milliLiter(s) IntraMuscular once      PHYSICAL EXAM:  T(C): 36.7 (10-21-24 @ 05:13), Max: 36.8 (10-20-24 @ 14:58)  HR: 64 (10-21-24 @ 05:13) (64 - 74)  BP: 107/69 (10-21-24 @ 05:13) (107/69 - 119/78)  RR: 18 (10-21-24 @ 05:13) (18 - 19)  SpO2: 98% (10-21-24 @ 05:13) (97% - 98%)  Wt(kg): --  I&O's Summary    21 Oct 2024 07:01  -  21 Oct 2024 10:02  --------------------------------------------------------  IN: 240 mL / OUT: 0 mL / NET: 240 mL        Appearance: Normal	  HEENT:   Normal oral mucosa, PERRL, EOMI	  Lymphatic: No lymphadenopathy  Cardiovascular: Normal S1 S2, No JVD, No murmurs, No edema  Respiratory: Lungs clear to auscultation	  Psychiatry: A & O x 3, Mood & affect appropriate  Gastrointestinal:  Soft, Non-tender, + BS	  Skin: No rashes, No ecchymoses, No cyanosis	  Neurologic: Non-focal  Extremities: Normal range of motion, No clubbing, cyanosis or edema  Vascular: Peripheral pulses palpable 2+ bilaterally      LABS:    CARDIAC MARKERS:    Respiratory Viral Panel with COVID-19 by ANGEL (10.20.24 @ 06:41)   Rapid RVP Result: [facility]  SARS-CoV-2: [facility]: This Respiratory Panel uses polymerase chain reaction (PCR) to detect for   adenovirus; coronavirus (HKU1, NL63, 229E, OC43); human metapneumovirus   (hMPV); human enterovirus/rhinovirus (Entero/RV); influenza A; influenza   A/H1; influenza A/H3; influenza A/H1-2009; influenza B; parainfluenza   viruses 1, 2, 3, 4; respiratory syncytial virus; Mycoplasma pneumoniae;   Chlamydophila pneumoniae; and SARS-CoV-2.                            12.0   9.43  )-----------( 254      ( 20 Oct 2024 04:45 )             34.1     10-20    138  |  105  |  14  ----------------------------<  98  4.2   |  17[L]  |  0.92    Ca    9.5      20 Oct 2024 04:45  Phos  3.8     10-19  Mg     2.3     10-19    TPro  7.4  /  Alb  3.8  /  TBili  0.7  /  DBili  x   /  AST  31  /  ALT  20  /  AlkPhos  53  10-20    proBNP:   Lipid Profile:   HgA1c:   TSH:             TELEMETRY: 	SR    ECG:  	  RADIOLOGY:   DIAGNOSTIC TESTING:  [ ] Echocardiogram:  [ ]  Catheterization:  [ ] Stress Test:    OTHER: 	          
Subjective: Patient seen and examined. No new events except as noted.     REVIEW OF SYSTEMS:    CONSTITUTIONAL:+ weakness, fevers or chills  EYES/ENT: No visual changes;  No vertigo or throat pain   NECK: No pain or stiffness  RESPIRATORY: No cough, wheezing, hemoptysis; No shortness of breath  CARDIOVASCULAR: No chest pain or palpitations  GASTROINTESTINAL: No abdominal or epigastric pain. No nausea, vomiting, or hematemesis; No diarrhea or constipation. No melena or hematochezia.  GENITOURINARY: No dysuria, frequency or hematuria  NEUROLOGICAL: No numbness or weakness  SKIN: No itching, burning, rashes, or lesions   All other review of systems is negative unless indicated above.    MEDICATIONS:  MEDICATIONS  (STANDING):  apixaban 5 milliGRAM(s) Oral every 12 hours  cefTRIAXone   IVPB 1000 milliGRAM(s) IV Intermittent every 24 hours  famotidine    Tablet 20 milliGRAM(s) Oral daily  fluticasone propionate/ salmeterol 250-50 MICROgram(s) Diskus 1 Dose(s) Inhalation two times a day  influenza  Vaccine (HIGH DOSE) 0.5 milliLiter(s) IntraMuscular once  pantoprazole    Tablet 40 milliGRAM(s) Oral before breakfast      PHYSICAL EXAM:  T(C): 36.7 (10-23-24 @ 04:00), Max: 37.2 (10-22-24 @ 20:21)  HR: 82 (10-23-24 @ 04:00) (82 - 83)  BP: 136/84 (10-23-24 @ 04:00) (102/68 - 136/84)  RR: 18 (10-23-24 @ 04:00) (18 - 18)  SpO2: 99% (10-23-24 @ 04:00) (98% - 99%)  Wt(kg): --  I&O's Summary    22 Oct 2024 07:01  -  23 Oct 2024 07:00  --------------------------------------------------------  IN: 360 mL / OUT: 0 mL / NET: 360 mL          Appearance: Normal	  HEENT:   Normal oral mucosa, PERRL, EOMI	  Lymphatic: No lymphadenopathy , no edema  Cardiovascular: Normal S1 S2, No JVD, No murmurs , Peripheral pulses palpable 2+ bilaterally  Respiratory: Lungs clear to auscultation, normal effort 	  Gastrointestinal:  Soft, Non-tender, + BS	  Skin: No rashes, No ecchymoses, No cyanosis, warm to touch  Musculoskeletal: Normal range of motion, normal strength  Psychiatry:  Mood & affect appropriate  Ext: No edema      LABS:    CARDIAC MARKERS:      Sickle Cell Hemoglobin Electrophoresis (10.21.24 @ 16:22)   Hemoglobin A%: 0.0 %  Hemoglobin A2%: 4.7 %  Hemoglobin F%: 2.2 %  Hemoglobin S%: 50.8: For quantitative assessment of RBC transfusion treatment. %  Hemoglobin C%: 42.3 %  Hemoglobin Variant 1%: 0.0 %Sickle Cell Disease Screening Test: Positive: Note: solubility testing alone is not sufficient for detecting or   confirming the presence of sickling hemoglobins. hemoglobin   electrophoresis should be performed. (10.21.24 @ 16:22)             proBNP:   Lipid Profile:   HgA1c:   TSH:             TELEMETRY: 	  SR  ECG:  	  RADIOLOGY:   DIAGNOSTIC TESTING:  [ ] Echocardiogram:  e< from: TTE W or WO Ultrasound Enhancing Agent (10.20.24 @ 07:03) >    TRANSTHORACIC ECHOCARDIOGRAM REPORT  ________________________________________________________________________________                                      _______       Pt. Name:       ANNALEE PALMER  Study Date:    10/20/2024  MRN:            [id number]   YOB: 1960  Accession #:    [id number]    Age:           64 years  Account#:       [id number] Gender:        F  Heart Rate:                  Height:        64.00 in (162.56 cm)  Rhythm:                      Weight:        180.00 lb (81.65 kg)  Blood Pressure: 144/87 mmHg  BSA/BMI:       1.87 m² / 30.90 kg/m²  ________________________________________________________________________________________  Referring Physician:    NAVARRO POLO  Interpreting Physician: Rody Saldana MD  Primary Sonographer:    Cintia Rea    CPT:               3D RECONST W/O WKSTATION - 61914.m;ECHO TTE WO CON COMP W                     DOPP - 84947.m  Indication(s):     Chest pain, unspecified - R07.9  Procedure:         Transthoracic echocardiogram with 2-D, M-mode and complete                     spectral and color flow Doppler. Real time and full volume                     3-dimensional imaging performed at the echo machine.  Ordering Location: [facility]  Admission Status:  ED  Study Information: Image quality for this study is adequate.    _______________________________________________________________________________________     CONCLUSIONS:      1. Left ventricular cavity is normal in size. Left ventricular wall thickness is normal. Left ventricular systolic function is normal with an ejection fraction of 62 % by Wheeler's method of disks. There are no regional wall motion abnormalities seen.   2. Normal left ventricular diastolic function, with normal left ventricular filling pressure.   3. Normal right ventricular cavity size, with normal wall thickness, and normal right ventricular systolic function.   4. Estimated pulmonary artery systolic pressure is 21 mmHg.   5. Normal left and right atrial size.   6. No significant valvular disease.   7. No pericardial effusion seen.   8. The inferior vena cava is normal in size (normal <2.1cm) with normal inspiratory collapse (normal >50%) consistent with normal right atrial pressure (~3, range 0-5mmHg).    ________________________________________________________________________________________  FINDINGS:     Left Ventricle:  The left ventricular cavity is normal in size. Left ventricular wall thickness is normal. Left ventricular systolic function is normal with a calculated ejection fraction of 62 % by the Wheeler's biplane method of disks. There are no regional wall motion abnormalities seen. There is normal LV mass and normal geometry. There is normal left ventricular diastolic function, with normal left ventricularfilling pressure.     Right Ventricle:  The right ventricular cavity is normal in size, with normal wall thickness and right ventricular systolic function is normal. Tricuspid annular plane systolic excursion (TAPSE) is 2.0 cm (normal >=1.7 cm).     Left Atrium:  The left atrium is normal in size with an indexed volume of 28.92 ml/m².     Right Atrium:  The right atrium is normal in size.     Interatrial Septum:  The interatrial septum appears intact.     Aortic Valve:  The aortic valve is tricuspid with normal leaflet excursion. There is no aortic valve stenosis. There is no evidence of aortic regurgitation.     Mitral Valve:  Structurally normal mitral valve with normal leaflet excursion. There is no mitral valve stenosis. There is trace mitral regurgitation.     Tricuspid Valve:  Structurally normal tricuspid valve with normal leaflet excursion. There is mild tricuspid regurgitation. Estimated pulmonary artery systolic pressure is 21 mmHg.     Pulmonic Valve:  Structurally normal pulmonic valve with normal leaflet excursion. There is trace pulmonic regurgitation.     Aorta:  The aortic root appears normal in size. The aortic root at the sinuses of Valsalva is normal in size, measuring 2.60 cm (indexed 1.39 cm/m²). The ascending aorta is normal in size, measuring 3.10 cm (indexed 1.66 cm/m²).     Pericardium:  No pericardial effusion seen.     Systemic Veins:  The inferior vena cava is normal in size (normal <2.1cm) with normal inspiratory collapse (normal >50%) consistent with normal right atrial pressure (~3, range 0-5mmHg).  ____________________________________________________________________    < end of copied text >    [ ]  Catheterization:  [ ] Stress Test:    OTHER: 	          
Date of Service: 10-22-24 @ 15:12    Patient is a 64y old  Female who presents with a chief complaint of sob (21 Oct 2024 09:34)      Any change in ROS: she has siclle cell trait in her family:     MEDICATIONS  (STANDING):  apixaban 5 milliGRAM(s) Oral every 12 hours  cefTRIAXone   IVPB 1000 milliGRAM(s) IV Intermittent every 24 hours  famotidine    Tablet 20 milliGRAM(s) Oral daily  fluticasone propionate/ salmeterol 250-50 MICROgram(s) Diskus 1 Dose(s) Inhalation two times a day  influenza  Vaccine (HIGH DOSE) 0.5 milliLiter(s) IntraMuscular once  pantoprazole    Tablet 40 milliGRAM(s) Oral before breakfast    MEDICATIONS  (PRN):    Vital Signs Last 24 Hrs  T(C): 36.7 (22 Oct 2024 11:18), Max: 37.1 (22 Oct 2024 00:00)  T(F): 98.1 (22 Oct 2024 11:18), Max: 98.7 (22 Oct 2024 00:00)  HR: 78 (22 Oct 2024 11:18) (67 - 78)  BP: 121/72 (22 Oct 2024 11:18) (101/65 - 121/72)  BP(mean): --  RR: 18 (22 Oct 2024 11:18) (18 - 19)  SpO2: 99% (22 Oct 2024 11:18) (96% - 99%)    Parameters below as of 22 Oct 2024 11:18  Patient On (Oxygen Delivery Method): room air        I&O's Summary    21 Oct 2024 07:01  -  22 Oct 2024 07:00  --------------------------------------------------------  IN: 1090 mL / OUT: 0 mL / NET: 1090 mL    22 Oct 2024 07:01  -  22 Oct 2024 15:12  --------------------------------------------------------  IN: 360 mL / OUT: 0 mL / NET: 360 mL          Physical Exam:   GENERAL: NAD, well-groomed, well-developed  HEENT: MITCHELL/   Atraumatic, Normocephalic  ENMT: No tonsillar erythema, exudates, or enlargement; Moist mucous membranes, Good dentition, No lesions  NECK: Supple, No JVD, Normal thyroid  CHEST/LUNG: Clear to auscultaion  CVS: Regular rate and rhythm; No murmurs, rubs, or gallops  GI: : Soft, Nontender, Nondistended; Bowel sounds present  NERVOUS SYSTEM:  Alert & Oriented X3  EXTREMITIES:  - edema  LYMPH: No lymphadenopathy noted  SKIN: No rashes or lesions  ENDOCRINOLOGY: No Thyromegaly  PSYCH: Appropriate    Labs:                              12.0   9.43  )-----------( 254      ( 20 Oct 2024 04:45 )             34.1                         11.5   11.39 )-----------( 346      ( 19 Oct 2024 17:28 )             31.3     10-20    138  |  105  |  14  ----------------------------<  98  4.2   |  17[L]  |  0.92  10-19    141  |  107  |  18  ----------------------------<  105[H]  4.6   |  21[L]  |  1.00      TPro  7.4  /  Alb  3.8  /  TBili  0.7  /  DBili  x   /  AST  31  /  ALT  20  /  AlkPhos  53  10-20  TPro  8.0  /  Alb  4.4  /  TBili  0.7  /  DBili  x   /  AST  37  /  ALT  21  /  AlkPhos  59  10-19    CAPILLARY BLOOD GLUCOSE          rad< from: CT Angio Chest Aorta w/wo IV Cont (10.19.24 @ 18:12) >  arthritis of both glenohumeral joints with subchondral cysts.    IMPRESSION:  No aortic dissection or aortic aneurysm or pulmonary embolism.    Bilateral mosaic lungattenuation mostly in the lower lobes. Differential   diagnosis includes air trapping, alveolar edema, or groundglass   infiltrates. Small patchy bilateral lower lobe opacities could represent   developing pneumonia.    Gastric wall thickening versusunderdistention, correlate clinically for   gastritis.    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 Patchy sclerosis   L4, L5 and S1 vertebral bodies. Correlate clinically for history of   sickle cell disease.    Additional findings noted above.    --- End of Report ---    < end of copied text >  < from: Xray Chest 2 Views PA/Lat (10.19.24 @ 18:07) >  INTERPRETATION:  EXAMINATION: XR CHEST PA AND LATERAL    CLINICAL INDICATION: Chest pain.    TECHNIQUE: 2 views; Frontal and lateral views of the chest were obtained.    COMPARISON: Chest CT.    FINDINGS:  The heart is normal in size.  Mild nonspecific patchy airspace opacities in the bilateral lower lung   fields.  There is no pneumothorax or pleural effusion.  No acute bony abnormality.    IMPRESSION:  Mild nonspecific patchy airspace opacities in the bilateral lower lung   fields.  Recommend correlation with same-day chest CT.    --- End of Report ---          ALLA ROSS MD; Resident Radiologist  This document has been electronically signed.  NAVARRO ISSA MD; Attending Radiologist  This document has been electronically signed. Oct 20 2024  8:59AM    < end of copied text >        Procalcitonin: 0.10 ng/mL (10-20 @ 04:45)        RECENT CULTURES:        RESPIRATORY CULTURES:        rtad< from: Xray Chest 2 Views PA/Lat (10.19.24 @ 18:07) >  TECHNIQUE: 2 views; Frontal and lateral views of the chest were obtained.    COMPARISON: Chest CT.    FINDINGS:  The heart is normal in size.  Mild nonspecific patchy airspace opacities in the bilateral lower lung   fields.  There is no pneumothorax or pleural effusion.  No acute bony abnormality.    IMPRESSION:  Mild nonspecific patchy airspace opacities in the bilateral lower lung   fields.  Recommend correlation with same-day chest CT.    --- End of Report ---          ALLA ROSS MD; Resident Radiologist  This document has been electronically signed.  NAVARRO ISSA MD; Attending Radiologist  This document has been electronically signed. Oct 20 2024  8:59AM    < end of copied text >    Studies  Chest X-RAY  CT SCAN Chest   Venous Dopplers: LE:   CT Abdomen  Others              
Date of Service: 10-23-24 @ 17:15    Patient is a 64y old  Female who presents with a chief complaint of sob (21 Oct 2024 09:34)      Any change in ROS: seems OK:  no sob:      MEDICATIONS  (STANDING):  apixaban 5 milliGRAM(s) Oral every 12 hours  cefTRIAXone   IVPB 1000 milliGRAM(s) IV Intermittent every 24 hours  famotidine    Tablet 20 milliGRAM(s) Oral daily  fluticasone propionate/ salmeterol 250-50 MICROgram(s) Diskus 1 Dose(s) Inhalation two times a day  influenza  Vaccine (HIGH DOSE) 0.5 milliLiter(s) IntraMuscular once  pantoprazole    Tablet 40 milliGRAM(s) Oral before breakfast  sodium chloride 0.45%. 1000 milliLiter(s) (50 mL/Hr) IV Continuous <Continuous>    MEDICATIONS  (PRN):    Vital Signs Last 24 Hrs  T(C): 36.9 (23 Oct 2024 16:00), Max: 37.2 (22 Oct 2024 20:21)  T(F): 98.4 (23 Oct 2024 16:00), Max: 99 (22 Oct 2024 20:21)  HR: 75 (23 Oct 2024 16:00) (75 - 83)  BP: 106/66 (23 Oct 2024 16:00) (102/68 - 136/84)  BP(mean): --  RR: 18 (23 Oct 2024 16:00) (18 - 18)  SpO2: 99% (23 Oct 2024 16:00) (98% - 99%)    Parameters below as of 23 Oct 2024 16:00  Patient On (Oxygen Delivery Method): room air        I&O's Summary    22 Oct 2024 07:01  -  23 Oct 2024 07:00  --------------------------------------------------------  IN: 360 mL / OUT: 0 mL / NET: 360 mL    23 Oct 2024 07:01  -  23 Oct 2024 17:15  --------------------------------------------------------  IN: 180 mL / OUT: 0 mL / NET: 180 mL          Physical Exam:   GENERAL: NAD, well-groomed, well-developed  HEENT: MITCHELL/   Atraumatic, Normocephalic  ENMT: No tonsillar erythema, exudates, or enlargement; Moist mucous membranes, Good dentition, No lesions  NECK: Supple, No JVD, Normal thyroid  CHEST/LUNG: Clear to auscultaion  CVS: Regular rate and rhythm; No murmurs, rubs, or gallops  GI: : Soft, Nontender, Nondistended; Bowel sounds present  NERVOUS SYSTEM:  Alert & Oriented X3  EXTREMITIES:  2+ Peripheral Pulses, No clubbing, cyanosis, or edema  LYMPH: No lymphadenopathy noted  SKIN: No rashes or lesions  ENDOCRINOLOGY: No Thyromegaly  PSYCH: Appropriate    Labs:                              12.0   9.43  )-----------( 254      ( 20 Oct 2024 04:45 )             34.1                         11.5   11.39 )-----------( 346      ( 19 Oct 2024 17:28 )             31.3     10-20    138  |  105  |  14  ----------------------------<  98  4.2   |  17[L]  |  0.92  10-19    141  |  107  |  18  ----------------------------<  105[H]  4.6   |  21[L]  |  1.00      TPro  7.4  /  Alb  3.8  /  TBili  0.7  /  DBili  x   /  AST  31  /  ALT  20  /  AlkPhos  53  10-20  TPro  8.0  /  Alb  4.4  /  TBili  0.7  /  DBili  x   /  AST  37  /  ALT  21  /  AlkPhos  59  10-19    CAPILLARY BLOOD GLUCOSE        rad< from: CT Angio Chest Aorta w/wo IV Cont (10.19.24 @ 18:12) >  LYMPH NODES: 1.2 cm likely enlarged left inguinal lymph node. Bilateral   normal size/subcentimeter inguinal lymph nodes.  ABDOMINAL WALL: Within normal limits.  BONES: Avascular necrosis/bone infarct in the right femoral head. Severe   degenerative arthritis of the right hip including subchondral cysts and   sclerosis. Mild degenerative arthritis of the left hip. Degenerative   spine changes worse at L4-5. Patchy appearance throughout the vertebral   bodies. Patchy sclerosis L4, L5 and S1 vertebral bodies. Degenerative   arthritis of both glenohumeral joints with subchondral cysts.    IMPRESSION:  No aortic dissection or aortic aneurysm or pulmonary embolism.    Bilateral mosaic lungattenuation mostly in the lower lobes. Differential   diagnosis includes air trapping, alveolar edema, or groundglass   infiltrates. Small patchy bilateral lower lobe opacities could represent   developing pneumonia.    Gastric wall thickening versusunderdistention, correlate clinically for   gastritis.    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 Patchy sclerosis   L4, L5 and S1 vertebral bodies. Correlate clinically for history of   sickle cell disease.    Additional findings noted above.    --- End of Report ---            MARIA DOLORES JEONG MD; Attending Radiologist  This document has been electronically signed. Oct 19 2024  6:38PM    < end of copied text >          Procalcitonin: 0.10 ng/mL (10-20 @ 04:45)        RECENT CULTURES:        RESPIRATORY CULTURES:          Studies  Chest X-RAY  CT SCAN Chest   Venous Dopplers: LE:   CT Abdomen  Others              
Date of Service: 10-24-24 @ 16:39    Patient is a 64y old  Female who presents with a chief complaint of sob (21 Oct 2024 09:34)      Any change in ROS: seems OK:  no sob:       MEDICATIONS  (STANDING):  apixaban 5 milliGRAM(s) Oral every 12 hours  famotidine    Tablet 20 milliGRAM(s) Oral daily  fluticasone propionate/ salmeterol 250-50 MICROgram(s) Diskus 1 Dose(s) Inhalation two times a day  folic acid 1 milliGRAM(s) Oral daily  influenza  Vaccine (HIGH DOSE) 0.5 milliLiter(s) IntraMuscular once  pantoprazole    Tablet 40 milliGRAM(s) Oral before breakfast  sodium chloride 0.45%. 1000 milliLiter(s) (50 mL/Hr) IV Continuous <Continuous>    MEDICATIONS  (PRN):    Vital Signs Last 24 Hrs  T(C): 37.2 (24 Oct 2024 11:51), Max: 37.2 (24 Oct 2024 11:51)  T(F): 99 (24 Oct 2024 11:51), Max: 99 (24 Oct 2024 11:51)  HR: 84 (24 Oct 2024 11:51) (72 - 84)  BP: 123/72 (24 Oct 2024 11:51) (98/64 - 123/72)  BP(mean): --  RR: 18 (24 Oct 2024 11:51) (18 - 18)  SpO2: 98% (24 Oct 2024 11:51) (97% - 98%)    Parameters below as of 24 Oct 2024 11:51  Patient On (Oxygen Delivery Method): room air        I&O's Summary    23 Oct 2024 07:01  -  24 Oct 2024 07:00  --------------------------------------------------------  IN: 420 mL / OUT: 0 mL / NET: 420 mL    24 Oct 2024 07:01  -  24 Oct 2024 16:39  --------------------------------------------------------  IN: 180 mL / OUT: 0 mL / NET: 180 mL          Physical Exam:   GENERAL: NAD, well-groomed, well-developed  HEENT: MITCHELL/   Atraumatic, Normocephalic  ENMT: No tonsillar erythema, exudates, or enlargement; Moist mucous membranes, Good dentition, No lesions  NECK: Supple, No JVD, Normal thyroid  CHEST/LUNG: Clear to auscultaion  CVS: Regular rate and rhythm; No murmurs, rubs, or gallops  GI: : Soft, Nontender, Nondistended; Bowel sounds present  NERVOUS SYSTEM:  Alert & Oriented X3  EXTREMITIES: - edema  LYMPH: No lymphadenopathy noted  SKIN: No rashes or lesions  ENDOCRINOLOGY: No Thyromegaly  PSYCH: Appropriate    Labs:                CAPILLARY BLOOD GLUCOSE          rad< from: CT Angio Chest Aorta w/wo IV Cont (10.19.24 @ 18:12) >  spine changes worse at L4-5. Patchy appearance throughout the vertebral   bodies. Patchy sclerosis L4, L5 and S1 vertebral bodies. Degenerative   arthritis of both glenohumeral joints with subchondral cysts.    IMPRESSION:  No aortic dissection or aortic aneurysm or pulmonary embolism.    Bilateral mosaic lungattenuation mostly in the lower lobes. Differential   diagnosis includes air trapping, alveolar edema, or groundglass   infiltrates. Small patchy bilateral lower lobe opacities could represent   developing pneumonia.    Gastric wall thickening versusunderdistention, correlate clinically for   gastritis.    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 Patchy sclerosis   L4, L5 and S1 vertebral bodies. Correlate clinically for history of   sickle cell disease.    Additional findings noted above.    --- End of Report ---    < end of copied text >  < from: Xray Chest 2 Views PA/Lat (10.19.24 @ 18:07) >    COMPARISON: Chest CT.    FINDINGS:  The heart is normal in size.  Mild nonspecific patchy airspace opacities in the bilateral lower lung   fields.  There is no pneumothorax or pleural effusion.  No acute bony abnormality.    IMPRESSION:  Mild nonspecific patchy airspace opacities in the bilateral lower lung   fields.  Recommend correlation with same-day chest CT.    --- End of Report ---          ALLA ROSS MD; Resident Radiologist  This document has been electronically signed.  NAVARRO ISSA MD; Attending Radiologist  This document has been electronically signed. Oct 20 2024  8:59AM    < end of copied text >              RECENT CULTURES:        RESPIRATORY CULTURES:          Studies  Chest X-RAY  CT SCAN Chest   Venous Dopplers: LE:   CT Abdomen  Others              
Subjective: Patient seen and examined. No new events except as noted.   feeling much better     REVIEW OF SYSTEMS:    CONSTITUTIONAL: No weakness, fevers or chills  EYES/ENT: No visual changes;  No vertigo or throat pain   NECK: No pain or stiffness  RESPIRATORY: No cough, wheezing, hemoptysis; No shortness of breath  CARDIOVASCULAR: No chest pain or palpitations  GASTROINTESTINAL: No abdominal or epigastric pain. No nausea, vomiting, or hematemesis; No diarrhea or constipation. No melena or hematochezia.  GENITOURINARY: No dysuria, frequency or hematuria  NEUROLOGICAL: No numbness or weakness  SKIN: No itching, burning, rashes, or lesions   All other review of systems is negative unless indicated above.    MEDICATIONS:  MEDICATIONS  (STANDING):  apixaban 5 milliGRAM(s) Oral every 12 hours  cefTRIAXone   IVPB 1000 milliGRAM(s) IV Intermittent every 24 hours  famotidine    Tablet 20 milliGRAM(s) Oral daily  fluticasone propionate/ salmeterol 250-50 MICROgram(s) Diskus 1 Dose(s) Inhalation two times a day  influenza  Vaccine (HIGH DOSE) 0.5 milliLiter(s) IntraMuscular once  pantoprazole    Tablet 40 milliGRAM(s) Oral before breakfast      PHYSICAL EXAM:  T(C): 37.1 (10-22-24 @ 04:00), Max: 37.1 (10-22-24 @ 00:00)  HR: 70 (10-22-24 @ 04:00) (67 - 77)  BP: 114/74 (10-22-24 @ 04:00) (101/65 - 118/69)  RR: 18 (10-22-24 @ 04:00) (18 - 19)  SpO2: 96% (10-22-24 @ 04:00) (96% - 99%)  Wt(kg): --  I&O's Summary    21 Oct 2024 07:01  -  22 Oct 2024 07:00  --------------------------------------------------------  IN: 1090 mL / OUT: 0 mL / NET: 1090 mL          Appearance: Normal	  HEENT:   Normal oral mucosa, PERRL, EOMI	  Lymphatic: No lymphadenopathy , no edema  Cardiovascular: Normal S1 S2, No JVD, No murmurs , Peripheral pulses palpable 2+ bilaterally  Respiratory: Lungs clear to auscultation, normal effort 	  Gastrointestinal:  Soft, Non-tender, + BS	  Skin: No rashes, No ecchymoses, No cyanosis, warm to touch  Musculoskeletal: Normal range of motion, normal strength  Psychiatry:  Mood & affect appropriate  Ext: No edema      LABS:    CARDIAC MARKERS:                  proBNP:   Lipid Profile:   HgA1c:   TSH:             TELEMETRY: 	SR    ECG:  	  RADIOLOGY:   DIAGNOSTIC TESTING:  [ ] Echocardiogram:  [ ]  Catheterization:  [ ] Stress Test:    OTHER:

## 2024-10-24 NOTE — PROGRESS NOTE ADULT - ASSESSMENT
64 yof well known to me from office w/ hx of PE on AC and compliant, BIBEMS from clinic c/f waxing waning CP. is described as dull pressure in midchest. No fevers, no chills, n/v/d. Given ASA enrout  Of note she had a Nuclear stress test in my office last week   she says she is feeling  ok today  : sometimes feels chest tightness:  but have been on regular Eliquis for previous episode of pe last year:  she has no strong family hisotry of pe:  has no underlying malignancy :  not sure if she was ever evaluated by hemonc:     Pulmonary embolism;   -ct ch est showed: No aortic dissection or aortic aneurysm or pulmonary embolism. Bilateral mosaic lung attenuation mostly in the lower lobes. Differential diagnosis includes air trapping, alveolar edema, or groundglass infiltrates. Small patchy bilateral lower lobe opacities could represent developing pneumonia. Gastric wall thickening versusunderdistention, correlate clinically for gastritis. 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 Patchy sclerosis L4, L5 and S1 vertebral bodies. Correlate clinically for history of sickle cell disease.      she has a history of PE last year:  presented with chest pressure:    no pe on this admission : she has been taking eliquis regularly:   she has mosaic attenuation of elodia lower lobes; she has no clinical features of pneumonia:  she did have high WBC on admission which reversed now: on empiric antibiotics  no fever:   -mosaic attenuation  add empiric advair:  would need pft as an otpt   -the ct scan chest suggest splenectomy:  she never had any surgical removal of spleen:  ? auto splenectomy:   -she does have bone pains:  but never be diagnosed with sickle cell disease:  would need lanre pfor i t:   cont ac:     10/22:  -has positive screenig test for sickle cell disease:  electrophoresis is pending:  she never had sickle cell crisis:  but in her family she does have sickle cell trait;   -cont eliquis;  cont current rx:     10/23:  -on ac:  seems to be doing  ok :  - no sob  following by hemonc      dw acp  
64 yof well known to me from office w/ hx of PE on AC and compliant, BIBEMS from clinic c/f waxing waning CP. is described as dull pressure in midchest. No fevers, no chills, n/v/d. Given ASA enrout  Of note she had a Nuclear stress test in my office last week   she says she is feeling  ok today  : sometimes feels chest tightness:  but have been on regular Eliquis for previous episode of pe last year:  she has no strong family hisotry of pe:  has no underlying malignancy :  not sure if she was ever evaluated by hemonc:     Pulmonary embolism;   -ct ch est showed: No aortic dissection or aortic aneurysm or pulmonary embolism. Bilateral mosaic lung attenuation mostly in the lower lobes. Differential diagnosis includes air trapping, alveolar edema, or groundglass infiltrates. Small patchy bilateral lower lobe opacities could represent developing pneumonia. Gastric wall thickening versusunderdistention, correlate clinically for gastritis. 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 Patchy sclerosis L4, L5 and S1 vertebral bodies. Correlate clinically for history of sickle cell disease.      she has a history of PE last year:  presented with chest pressure:    no pe on this admission : she has been taking eliquis regularly:   she has mosaic attenuation of elodia lower lobes; she has no clinical features of pneumonia:  she did have high WBC on admission which reversed now: on empiric antibiotics  no fever:   -mosaic attenuation  add empiric advair:  would need pft as an otpt   -the ct scan chest suggest splenectomy:  she never had any surgical removal of spleen:  ? auto splenectomy:   -she does have bone pains:  but never be diagnosed with sickle cell disease:  would need lanre pfor i t:   cont ac:     10/22:  -has positive screenig test for sickle cell disease:  electrophoresis is pending:  she never had sickle cell crisis:  but in her family she does have sickle cell trait;   -cont eliquis;  cont current rx:     10/23:  -on ac:  seems to be doing  ok :  - no sob  following by hemonc    10/24:  pulm enamorado seems OK:  on room air   -on eliquis now:   rest per hemon team : she has been on room air     dw acp  
64 yof well known to me from office w/ hx of PE on AC and compliant, BIBEMS from clinic c/f waxing waning CP. is described as dull pressure in midchest. No fevers, no chills, n/v/d. Given ASA enrout  Of note she had a Nuclear stress test in my office last week   she says she is feeling  ok today  : sometimes feels chest tightness:  but have been on regular Eliquis for previous episode of pe last year:  she has no strong family hisotry of pe:  has no underlying malignancy :  not sure if she was ever evaluated by hemonc:     Pulmonary embolism;   -ct ch est showed: No aortic dissection or aortic aneurysm or pulmonary embolism. Bilateral mosaic lung attenuation mostly in the lower lobes. Differential diagnosis includes air trapping, alveolar edema, or groundglass infiltrates. Small patchy bilateral lower lobe opacities could represent developing pneumonia. Gastric wall thickening versusunderdistention, correlate clinically for gastritis. 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 Patchy sclerosis L4, L5 and S1 vertebral bodies. Correlate clinically for history of sickle cell disease.      she has a history of PE last year:  presented with chest pressure:    no pe on this admission : she has been taking eliquis regularly:   she has mosaic attenuation of elodia lower lobes; she has no clinical features of pneumonia:  she did have high WBC on admission which reversed now: on empiric antibiotics  no fever:   -mosaic attenuation  add empiric advair:  would need pft as an otpt   -the ct scan chest suggest splenectomy:  she never had any surgical removal of spleen:  ? auto splenectomy:   -she does have bone pains:  but never be diagnosed with sickle cell disease:  would need lanre pfor i t:   cont ac:     10/22:  -has positive screenig test for sickle cell disease:  electrophoresis is pending:  she never had sickle cell crisis:  but in her family she does have sickle cell trait;   -cont eliquis;  cont current rx:     timothy arias  
64 yof well known to me from office w/ hx of PE on AC and compliant, BIBEMS from clinic c/f waxing waning CP. is described as dull pressure in midchest. No fevers, no chills, n/v/d. Given ASA enrout  Of note she had a Nuclear stress test in my office last week     

## 2024-10-25 ENCOUNTER — APPOINTMENT (OUTPATIENT)
Dept: PULMONOLOGY | Facility: CLINIC | Age: 64
End: 2024-10-25

## 2024-10-31 ENCOUNTER — APPOINTMENT (OUTPATIENT)
Dept: PULMONOLOGY | Facility: CLINIC | Age: 64
End: 2024-10-31

## 2024-11-05 RX ORDER — APIXABAN 5 MG/1
0 TABLET, FILM COATED ORAL
Refills: 0 | DISCHARGE

## 2024-11-05 RX ORDER — APIXABAN 5 MG/1
1 TABLET, FILM COATED ORAL
Refills: 0 | DISCHARGE

## 2024-11-05 RX ORDER — ASPIRIN/MAG CARB/ALUMINUM AMIN 325 MG
0 TABLET ORAL
Refills: 0 | DISCHARGE

## 2024-11-05 RX ORDER — LOSARTAN POTASSIUM 25 MG/1
1 TABLET ORAL
Refills: 0 | DISCHARGE

## 2024-11-05 RX ORDER — FENOFIBRATE 145 MG/1
1 TABLET, FILM COATED ORAL
Refills: 0 | DISCHARGE

## 2024-11-26 PROCEDURE — 85045 AUTOMATED RETICULOCYTE COUNT: CPT

## 2024-11-26 PROCEDURE — 84484 ASSAY OF TROPONIN QUANT: CPT

## 2024-11-26 PROCEDURE — 83880 ASSAY OF NATRIURETIC PEPTIDE: CPT

## 2024-11-26 PROCEDURE — 83615 LACTATE (LD) (LDH) ENZYME: CPT

## 2024-11-26 PROCEDURE — 87637 SARSCOV2&INF A&B&RSV AMP PRB: CPT

## 2024-11-26 PROCEDURE — 84145 PROCALCITONIN (PCT): CPT

## 2024-11-26 PROCEDURE — 85660 RBC SICKLE CELL TEST: CPT

## 2024-11-26 PROCEDURE — 93306 TTE W/DOPPLER COMPLETE: CPT

## 2024-11-26 PROCEDURE — 74174 CTA ABD&PLVS W/CONTRAST: CPT | Mod: MC

## 2024-11-26 PROCEDURE — 99285 EMERGENCY DEPT VISIT HI MDM: CPT

## 2024-11-26 PROCEDURE — 71046 X-RAY EXAM CHEST 2 VIEWS: CPT

## 2024-11-26 PROCEDURE — 94640 AIRWAY INHALATION TREATMENT: CPT

## 2024-11-26 PROCEDURE — 36415 COLL VENOUS BLD VENIPUNCTURE: CPT

## 2024-11-26 PROCEDURE — 80053 COMPREHEN METABOLIC PANEL: CPT

## 2024-11-26 PROCEDURE — 76376 3D RENDER W/INTRP POSTPROCES: CPT

## 2024-11-26 PROCEDURE — 96374 THER/PROPH/DIAG INJ IV PUSH: CPT

## 2024-11-26 PROCEDURE — 83010 ASSAY OF HAPTOGLOBIN QUANT: CPT

## 2024-11-26 PROCEDURE — 0225U NFCT DS DNA&RNA 21 SARSCOV2: CPT

## 2024-11-26 PROCEDURE — 83735 ASSAY OF MAGNESIUM: CPT

## 2024-11-26 PROCEDURE — 71275 CT ANGIOGRAPHY CHEST: CPT | Mod: MC

## 2024-11-26 PROCEDURE — 83020 HEMOGLOBIN ELECTROPHORESIS: CPT

## 2024-11-26 PROCEDURE — 84100 ASSAY OF PHOSPHORUS: CPT

## 2024-11-26 PROCEDURE — 85025 COMPLETE CBC W/AUTO DIFF WBC: CPT

## 2025-03-24 PROBLEM — I26.99 OTHER PULMONARY EMBOLISM WITHOUT ACUTE COR PULMONALE: Chronic | Status: ACTIVE | Noted: 2024-10-20

## 2025-03-24 PROBLEM — J90 PLEURAL EFFUSION, NOT ELSEWHERE CLASSIFIED: Chronic | Status: ACTIVE | Noted: 2024-10-19

## 2025-04-01 ENCOUNTER — OUTPATIENT (OUTPATIENT)
Dept: OUTPATIENT SERVICES | Facility: HOSPITAL | Age: 65
LOS: 1 days | End: 2025-04-01
Payer: MEDICARE

## 2025-04-01 ENCOUNTER — APPOINTMENT (OUTPATIENT)
Dept: CT IMAGING | Facility: CLINIC | Age: 65
End: 2025-04-01
Payer: MEDICARE

## 2025-04-01 DIAGNOSIS — Z00.8 ENCOUNTER FOR OTHER GENERAL EXAMINATION: ICD-10-CM

## 2025-04-01 PROCEDURE — 74160 CT ABDOMEN W/CONTRAST: CPT | Mod: 26

## 2025-04-01 PROCEDURE — 74160 CT ABDOMEN W/CONTRAST: CPT

## 2025-04-23 ENCOUNTER — NON-APPOINTMENT (OUTPATIENT)
Age: 65
End: 2025-04-23

## 2025-04-23 ENCOUNTER — INPATIENT (INPATIENT)
Facility: HOSPITAL | Age: 65
LOS: 1 days | Discharge: HOME HEALTH | DRG: 312 | End: 2025-04-25
Attending: INTERNAL MEDICINE | Admitting: INTERNAL MEDICINE
Payer: COMMERCIAL

## 2025-04-23 VITALS
HEART RATE: 72 BPM | HEIGHT: 64.96 IN | RESPIRATION RATE: 20 BRPM | WEIGHT: 194.89 LBS | TEMPERATURE: 98 F | OXYGEN SATURATION: 98 % | SYSTOLIC BLOOD PRESSURE: 152 MMHG | DIASTOLIC BLOOD PRESSURE: 90 MMHG

## 2025-04-23 DIAGNOSIS — R55 SYNCOPE AND COLLAPSE: ICD-10-CM

## 2025-04-23 LAB
ALBUMIN SERPL ELPH-MCNC: 4.3 G/DL — SIGNIFICANT CHANGE UP (ref 3.3–5)
ALP SERPL-CCNC: 79 U/L — SIGNIFICANT CHANGE UP (ref 40–120)
ALT FLD-CCNC: 24 U/L — SIGNIFICANT CHANGE UP (ref 10–45)
ANION GAP SERPL CALC-SCNC: 14 MMOL/L — SIGNIFICANT CHANGE UP (ref 5–17)
ANISOCYTOSIS BLD QL: SIGNIFICANT CHANGE UP
APTT BLD: 27.2 SEC — SIGNIFICANT CHANGE UP (ref 26.1–36.8)
AST SERPL-CCNC: 27 U/L — SIGNIFICANT CHANGE UP (ref 10–40)
BASOPHILS # BLD AUTO: 0.1 K/UL — SIGNIFICANT CHANGE UP (ref 0–0.2)
BASOPHILS NFR BLD AUTO: 0.9 % — SIGNIFICANT CHANGE UP (ref 0–2)
BILIRUB SERPL-MCNC: 0.8 MG/DL — SIGNIFICANT CHANGE UP (ref 0.2–1.2)
BUN SERPL-MCNC: 13 MG/DL — SIGNIFICANT CHANGE UP (ref 7–23)
CALCIUM SERPL-MCNC: 9.8 MG/DL — SIGNIFICANT CHANGE UP (ref 8.4–10.5)
CHLORIDE SERPL-SCNC: 99 MMOL/L — SIGNIFICANT CHANGE UP (ref 96–108)
CO2 SERPL-SCNC: 23 MMOL/L — SIGNIFICANT CHANGE UP (ref 22–31)
CREAT SERPL-MCNC: 0.76 MG/DL — SIGNIFICANT CHANGE UP (ref 0.5–1.3)
DACRYOCYTES BLD QL SMEAR: SLIGHT — SIGNIFICANT CHANGE UP
EGFR: 87 ML/MIN/1.73M2 — SIGNIFICANT CHANGE UP
EGFR: 87 ML/MIN/1.73M2 — SIGNIFICANT CHANGE UP
EOSINOPHIL # BLD AUTO: 0 K/UL — SIGNIFICANT CHANGE UP (ref 0–0.5)
EOSINOPHIL NFR BLD AUTO: 0 % — SIGNIFICANT CHANGE UP (ref 0–6)
FLUAV AG NPH QL: SIGNIFICANT CHANGE UP
FLUBV AG NPH QL: SIGNIFICANT CHANGE UP
GAS PNL BLDV: SIGNIFICANT CHANGE UP
GLUCOSE SERPL-MCNC: 222 MG/DL — HIGH (ref 70–99)
HCT VFR BLD CALC: 33.5 % — LOW (ref 34.5–45)
HGB BLD-MCNC: 12.4 G/DL — SIGNIFICANT CHANGE UP (ref 11.5–15.5)
INR BLD: 1.27 RATIO — HIGH (ref 0.85–1.16)
LYMPHOCYTES # BLD AUTO: 5.78 K/UL — HIGH (ref 1–3.3)
LYMPHOCYTES # BLD AUTO: 54.4 % — HIGH (ref 13–44)
MACROCYTES BLD QL: SIGNIFICANT CHANGE UP
MANUAL SMEAR VERIFICATION: SIGNIFICANT CHANGE UP
MCHC RBC-ENTMCNC: 28.8 PG — SIGNIFICANT CHANGE UP (ref 27–34)
MCHC RBC-ENTMCNC: 37 G/DL — HIGH (ref 32–36)
MCV RBC AUTO: 77.7 FL — LOW (ref 80–100)
MONOCYTES # BLD AUTO: 0.37 K/UL — SIGNIFICANT CHANGE UP (ref 0–0.9)
MONOCYTES NFR BLD AUTO: 3.5 % — SIGNIFICANT CHANGE UP (ref 2–14)
NEUTROPHILS # BLD AUTO: 4.28 K/UL — SIGNIFICANT CHANGE UP (ref 1.8–7.4)
NEUTROPHILS NFR BLD AUTO: 40.3 % — LOW (ref 43–77)
OVALOCYTES BLD QL SMEAR: SLIGHT — SIGNIFICANT CHANGE UP
PLAT MORPH BLD: ABNORMAL
PLATELET # BLD AUTO: 319 K/UL — SIGNIFICANT CHANGE UP (ref 150–400)
POIKILOCYTOSIS BLD QL AUTO: SIGNIFICANT CHANGE UP
POLYCHROMASIA BLD QL SMEAR: SLIGHT — SIGNIFICANT CHANGE UP
POTASSIUM SERPL-MCNC: 4.3 MMOL/L — SIGNIFICANT CHANGE UP (ref 3.5–5.3)
POTASSIUM SERPL-SCNC: 4.3 MMOL/L — SIGNIFICANT CHANGE UP (ref 3.5–5.3)
PROT SERPL-MCNC: 7.7 G/DL — SIGNIFICANT CHANGE UP (ref 6–8.3)
PROTHROM AB SERPL-ACNC: 14.6 SEC — HIGH (ref 9.9–13.4)
RBC # BLD: 4.31 M/UL — SIGNIFICANT CHANGE UP (ref 3.8–5.2)
RBC # FLD: 16.1 % — HIGH (ref 10.3–14.5)
RBC BLD AUTO: ABNORMAL
RSV RNA NPH QL NAA+NON-PROBE: SIGNIFICANT CHANGE UP
SARS-COV-2 RNA SPEC QL NAA+PROBE: SIGNIFICANT CHANGE UP
SODIUM SERPL-SCNC: 136 MMOL/L — SIGNIFICANT CHANGE UP (ref 135–145)
SOURCE RESPIRATORY: SIGNIFICANT CHANGE UP
TARGETS BLD QL SMEAR: SIGNIFICANT CHANGE UP
TROPONIN T, HIGH SENSITIVITY RESULT: <6 NG/L — SIGNIFICANT CHANGE UP (ref 0–51)
VARIANT LYMPHS # BLD: 0.9 % — SIGNIFICANT CHANGE UP (ref 0–6)
VARIANT LYMPHS NFR BLD MANUAL: 0.9 % — SIGNIFICANT CHANGE UP (ref 0–6)
WBC # BLD: 10.62 K/UL — HIGH (ref 3.8–10.5)
WBC # FLD AUTO: 10.62 K/UL — HIGH (ref 3.8–10.5)

## 2025-04-23 PROCEDURE — 70450 CT HEAD/BRAIN W/O DYE: CPT | Mod: 26,XU

## 2025-04-23 PROCEDURE — 70496 CT ANGIOGRAPHY HEAD: CPT | Mod: 26

## 2025-04-23 PROCEDURE — 71045 X-RAY EXAM CHEST 1 VIEW: CPT | Mod: 26

## 2025-04-23 PROCEDURE — 70498 CT ANGIOGRAPHY NECK: CPT | Mod: 26

## 2025-04-23 PROCEDURE — 99285 EMERGENCY DEPT VISIT HI MDM: CPT

## 2025-04-23 PROCEDURE — 93010 ELECTROCARDIOGRAM REPORT: CPT

## 2025-04-23 PROCEDURE — 99223 1ST HOSP IP/OBS HIGH 75: CPT

## 2025-04-23 RX ORDER — SODIUM CHLORIDE 9 G/1000ML
1000 INJECTION, SOLUTION INTRAVENOUS
Refills: 0 | Status: DISCONTINUED | OUTPATIENT
Start: 2025-04-23 | End: 2025-04-25

## 2025-04-23 RX ORDER — GLUCAGON 3 MG/1
1 POWDER NASAL ONCE
Refills: 0 | Status: DISCONTINUED | OUTPATIENT
Start: 2025-04-23 | End: 2025-04-25

## 2025-04-23 RX ORDER — INSULIN LISPRO 100 U/ML
INJECTION, SOLUTION INTRAVENOUS; SUBCUTANEOUS AT BEDTIME
Refills: 0 | Status: DISCONTINUED | OUTPATIENT
Start: 2025-04-23 | End: 2025-04-25

## 2025-04-23 RX ORDER — INSULIN LISPRO 100 U/ML
INJECTION, SOLUTION INTRAVENOUS; SUBCUTANEOUS
Refills: 0 | Status: DISCONTINUED | OUTPATIENT
Start: 2025-04-23 | End: 2025-04-25

## 2025-04-23 RX ORDER — ACETAMINOPHEN 500 MG/5ML
650 LIQUID (ML) ORAL EVERY 6 HOURS
Refills: 0 | Status: DISCONTINUED | OUTPATIENT
Start: 2025-04-23 | End: 2025-04-25

## 2025-04-23 RX ORDER — ONDANSETRON HCL/PF 4 MG/2 ML
4 VIAL (ML) INJECTION EVERY 8 HOURS
Refills: 0 | Status: DISCONTINUED | OUTPATIENT
Start: 2025-04-23 | End: 2025-04-25

## 2025-04-23 RX ORDER — DEXTROSE 50 % IN WATER 50 %
12.5 SYRINGE (ML) INTRAVENOUS ONCE
Refills: 0 | Status: DISCONTINUED | OUTPATIENT
Start: 2025-04-23 | End: 2025-04-25

## 2025-04-23 RX ORDER — MELATONIN 5 MG
3 TABLET ORAL AT BEDTIME
Refills: 0 | Status: DISCONTINUED | OUTPATIENT
Start: 2025-04-23 | End: 2025-04-25

## 2025-04-23 RX ORDER — DEXTROSE 50 % IN WATER 50 %
25 SYRINGE (ML) INTRAVENOUS ONCE
Refills: 0 | Status: DISCONTINUED | OUTPATIENT
Start: 2025-04-23 | End: 2025-04-25

## 2025-04-23 RX ORDER — DEXTROSE 50 % IN WATER 50 %
15 SYRINGE (ML) INTRAVENOUS ONCE
Refills: 0 | Status: DISCONTINUED | OUTPATIENT
Start: 2025-04-23 | End: 2025-04-25

## 2025-04-23 RX ORDER — MAGNESIUM, ALUMINUM HYDROXIDE 200-200 MG
30 TABLET,CHEWABLE ORAL EVERY 4 HOURS
Refills: 0 | Status: DISCONTINUED | OUTPATIENT
Start: 2025-04-23 | End: 2025-04-25

## 2025-04-23 RX ADMIN — INSULIN LISPRO 2: 100 INJECTION, SOLUTION INTRAVENOUS; SUBCUTANEOUS at 23:42

## 2025-04-23 RX ADMIN — Medication 500 MILLILITER(S): at 17:31

## 2025-04-23 NOTE — H&P ADULT - HISTORY OF PRESENT ILLNESS
64y F PMH PE on Eliquis, hemoglobin SC dz  presents with EMS for dizziness since 10am today. Pt went to   where she was noted to have d urine with glucose but no ketones. Pt states she checked her blood sugar over the past week and it  has been between 300-500. Pt denies prior diagnosis of diabetes. Pt also reports intermittent chest pain at baseline.  S/p stroke code in ED that was ultimately canceled as sx though to be related poor glucose control     ROS: Denies HA, SOB, palpitation, N/V/D, fever, cough, chills, abm pain, recent travel, sick contact, change in bowel or urinary habits   A 10-system ROS was performed and is negative except as noted above and/or in the HPI.

## 2025-04-23 NOTE — H&P ADULT - PROBLEM SELECTOR PLAN 4
- CTA H/N showing  6-7 mm irregular noncalcified nodule right upper lobe, of   indeterminate neoplastic potential  - CT chest ordered to further eval

## 2025-04-23 NOTE — ED PROVIDER NOTE - PROGRESS NOTE DETAILS
Juan Edmonds MD PGY-7: At this time patient's lab currently non-actionable with stable CBC with normal Hgb and platelet count. CMP is non-actionable with only findings for hyperglycemia with no acidosis or decreased bicarbonate concerning for new onset DKA in the setting of DM. At this time CT negatives for large vessel occlusion. Pending labwork for final disposition.

## 2025-04-23 NOTE — ED PROVIDER NOTE - CLINICAL SUMMARY MEDICAL DECISION MAKING FREE TEXT BOX
RGUJRAL 63yo female hx PE on Eliquis presents with EMS for dizziness since 10am today. Pt presented to the  and they noted Urine with glucose and no ketones. Pt states she checked her blood sugar over the past week and it was between 300-500. Pt is not diagnosed with diabetes. Code stroke called on arrival.  On exam, Patient is awake, alert and oriented x 3.  Patient appears weak, follows asleep intermittently, no shaking and arousable immediately to voice.  NCAT, EOMI.  Lungs are CTA B/L,+S1S2 no murmurs,  Abdomen:Soft nd/nt+bs no rebound or guarding.  Extremity no edema or calf tender.  Skin with no rash.  Neuro CN3-12 intact. Strength 5/5 in upper and lower extremities. Nml Sensation.  FS checked, pt received 150cc by EMS, Check labs, CT/CTA, Xray chest, Urine, rectal temp to eval. Supportive care and moniotr. RGUJRAL 63yo female hx PE on Eliquis presents with EMS for dizziness since 10am today. Pt presented to the  and they noted Urine with glucose and no ketones. Pt states she checked her blood sugar over the past week and it was between 300-500. Pt is not diagnosed with diabetes. Pt also reports intermittent chest pain at baseline. Denies any recent travel, sick contact, n/v/d. Code stroke called on arrival.  On exam, Patient is awake, alert and oriented x 3.  Patient appears weak, follows asleep intermittently, no shaking and arousable immediately to voice.  NCAT, EOMI.  Lungs are CTA B/L,+S1S2 no murmurs,  Abdomen:Soft nd/nt+bs no rebound or guarding.  Extremity no edema or calf tender.  Skin with no rash.  Neuro CN3-12 intact. Strength 5/5 in upper and lower extremities. Nml Sensation.  FS checked, pt received 150cc by EMS, Check labs, CT/CTA, Xray chest, Urine, rectal temp to eval. Supportive care and moniotr.

## 2025-04-23 NOTE — ED ADULT NURSE NOTE - CHIEF COMPLAINT QUOTE
dizziness since 10am  per EMS, also having elevated blood sugars over the last few days  code stroke called [id number]

## 2025-04-23 NOTE — ED ADULT TRIAGE NOTE - BEFAST ARM NUMBNESS
Caller: Laine Thomas    Relationship: Self    Best call back number:[phone]   What orders are you requesting (i.e. lab or imaging): LABS     In what timeframe would the patient need to come in: AS SOON AS POSSIBLE     Where will you receive your lab/imaging services: IN OFFICE     Additional notes: IS HAVING A LOT OF TROUBLE WITH LEG AND FOOT CRAMPS AT NIGHT.  WOULD LIKE TO SEE IF AN ORDER CAN BE PUT IN TO CHECK HER MAGNESIUM LEVELS            No

## 2025-04-23 NOTE — H&P ADULT - PROBLEM SELECTOR PLAN 1
- C/o dizziness since 10am, intermittent CP, hyperglycemia    - EKG: NSR   - Labs: cbc unrevealing, chem w/hyperglycemia, trop neg   - CXR: clear   - CTA H/N: neg for ICH, no significant stenosis or occlusion   - S/p code stroke, which was canceled as sx  attributed to hyperglycemia   -Telemetry   - Check Echo (ordered)   - Cardio consult to be called in AM

## 2025-04-23 NOTE — STROKE CODE NOTE - DISPOSITION
Code stroke cancelled as patient does not meet [facility] criteria, describes dizziness as lightheadedness. Exam without focal deficits at this time./Other Code stroke cancelled as patient does not meet [facility] criteria, describes dizziness as lightheadedness. Exam without focal deficits at this time. Patient is in new Onset DKA. Presentation is not neurologic./Other

## 2025-04-23 NOTE — ED ADULT TRIAGE NOTE - CHIEF COMPLAINT QUOTE
dizziness since 10am  per EMS, also having elevated blood sugars over the last few days  code stroke called [id number]

## 2025-04-23 NOTE — ED ADULT NURSE NOTE - OBJECTIVE STATEMENT
63yo F w/ PMH PE (on Eliquis) presents to ED via EMS from urgent care for dizziness. Code stroke activated by triage RN. As per EMS pt went to  for dizziness since 10am, also notes having elevated blood sugars, as per pt has been monitoring her sugars over the last week and was found to be in the 500's.  in ED. During exam pt intermittently falling asleep, arousable to verbal stimuli. Pt slow to answer questions but answering appropriately and following commands. 63yo F w/ PMH PE (on Eliquis) presents to ED via EMS from urgent care for dizziness. Code stroke activated by triage RN. As per EMS pt went to  for dizziness since 10am, also notes having elevated blood sugars, as per pt has been monitoring her sugars over the last week and was found to be in the 500's.  in ED. During exam pt intermittently falling asleep, arousable to verbal stimuli. Pt slow to answer questions but answering appropriately and following commands. Denies chest pain, unsteady gait, fevers/chills, sick contacts. A&Ox3 (slow to respond). Strong peripheral pulses. Neurologically intact and following commands. Pulse motor and sensation present and equal to all 4 extremities. NSR on cardiac monitor. Skin warm dry intact and normal for [id number]. Pt ambulatory w/ steady gait at baseline. Stretcher locked and in lowest position, appropriate side rails up. Pt instructed to notify RN if assistance is needed.

## 2025-04-23 NOTE — H&P ADULT - PROBLEM SELECTOR PLAN 3
- Pt w/hyperglycemia, glucosuria, no know prior hx/o DM   - No acidosis or hypocarbia , Anion gap, DKA less likely   - Pt likely with new onset DM2   - Low ISS ACHS for now. Further insulin recs based on sliding scale requirement   - CC diet  - Check A1c   - Endocrine consult  to be called in AM

## 2025-04-23 NOTE — H&P ADULT - NSHPLABSRESULTS_GEN_ALL_CORE
12.4   10.62 )-----------( 319      ( 23 Apr 2025 16:35 )             33.5       04-23    136  |  99  |  13  ----------------------------<  222[H]  4.3   |  23  |  0.76    Ca    9.8      23 Apr 2025 16:35    TPro  7.7  /  Alb  4.3  /  TBili  0.8  /  DBili  x   /  AST  27  /  ALT  24  /  AlkPhos  79  04-23    Troponin T, High Sensitivity Result: <6: ng/L (04.23.25 @ 16:35)  Thyroid Stimulating Hormone, Serum: 0.79 uIU/mL (04.23.25 @ 16:36)    Blood Gas Profile - Venous (04.23.25 @ 16:25)    pH, Venous: 7.40    pCO2, Venous: 46 mmHg    pO2, Venous: 39 mmHg    HCO3, Venous: 28 mmol/L    Base Excess, Venous: 3.0 mmol/L    Oxygen Saturation, Venous: 61.4 %    Total CO2, Venous: 30 mmol/L    Blood Gas Source Venous: Venous    FluA/FluB/RSV/COVID PCR (04.23.25 @ 17:21)    SARS-CoV-2 Result: NotDetec    Influenza A Result: NotDetec    Influenza B Result: NotDetec    Resp Syn Virus Result: NotDetec    Source Respiratory: Nasopharyngeal      - - - - - - - - - - - - - - - - - - - - - - - - - - - - - - - - - - - - - - - - - - - - - - - - - - - -       EKG PERSONALLY REVIEWED:  NSR 70 bpm       IMAGES PERSONALLY REVIEWED:     < from: Xray Chest 1 View AP/PA (04.23.25 @ 17:53) >  IMPRESSION: Clear lungs.    < from: CT Brain Stroke Protocol (04.23.25 @ 16:52) >  IMPRESSION:    CT HEAD:  1. No evidence of acute hemorrhage, hydrocephalus or vasogenic edema.  2. If clinical symptoms persist consider further imaging with MRI,   provided there are no medical contraindications.    CTA NECK:  1. Bilateral vertebral arteries patent.  2. No significant stenosis, occlusion or dissection bilateral extracranial carotid arteries.  3. Additional findings described in detail above, including a branching   opacity and 6-7 mm irregular noncalcified nodule right upper lobe, of   indeterminate neoplastic potential. Recommend nonemergent noncontrast CT   chest for further evaluation.    CTA HEAD:  1. No large vessel occlusion.

## 2025-04-23 NOTE — ED ADULT NURSE NOTE - NSFALLHARMRISKINTERV_ED_ALL_ED
Assistance OOB with selected safe patient handling equipment if applicable/Assistance with ambulation/Communicate risk of Fall with Harm to all staff, patient, and family/Encourage patient to sit up slowly, dangle for a short time, stand at bedside before walking/Monitor gait and stability/Orthostatic vital signs/Provide visual cue: red socks, yellow wristband, yellow gown, etc/Reinforce activity limits and safety measures with patient and family/Bed in lowest position, wheels locked, appropriate side rails in place/Call bell, personal items and telephone in reach/Instruct patient to call for assistance before getting out of bed/chair/stretcher/Non-slip footwear applied when patient is off stretcher/[place] to call system/Physically safe environment - no spills, clutter or unnecessary equipment/Purposeful Proactive Rounding/Room/bathroom lighting operational, light cord in reach

## 2025-04-23 NOTE — H&P ADULT - NSHPPHYSICALEXAM_GEN_ALL_CORE
T(C): 36.8 (04-23-25 @ 19:45), Max: 36.8 (04-23-25 @ 19:45)  HR: 65 (04-23-25 @ 19:45) (65 - 72)  BP: 148/86 (04-23-25 @ 19:45) (131/63 - 178/93)  RR: 18 (04-23-25 @ 19:45) (18 - 20)  SpO2: 98% (04-23-25 @ 19:45) (97% - 99%)    CONSTITUTIONAL: Well groomed, no apparent distress  EYES: PERRLA , EOMI  ENMT: MMM. Normal dentition  RESP: No respiratory distress, CTA b/l  CV: +S1S2, RRR, no peripheral edema  GI: Soft, NTND  MSK: spontaneously moves all extremities   SKIN: No rashes or ulcers noted  NEURO:  No focal deficits, sensation intact throughout   PSYCH: A+O x 3

## 2025-04-23 NOTE — H&P ADULT - ASSESSMENT
64y F PMH PE on Eliquis, hemoglobin SC dz  presents for c/o  dizziness since 10am today, intermittent CP and hyperglycemia being admitted

## 2025-04-23 NOTE — H&P ADULT - TIME-BASED
09/03/19 1400   CM/SW Screening   Referral Source Physician   Information Source Chart review;Nursing rounds   Patient's Mental Status Alert;Oriented   Patient's [address]   Patient lives with Spouse   Patient Status Prior to Admission   In 77

## 2025-04-24 ENCOUNTER — RESULT REVIEW (OUTPATIENT)
Age: 65
End: 2025-04-24

## 2025-04-24 DIAGNOSIS — I20.9 ANGINA PECTORIS, UNSPECIFIED: ICD-10-CM

## 2025-04-24 DIAGNOSIS — R07.9 CHEST PAIN, UNSPECIFIED: ICD-10-CM

## 2025-04-24 DIAGNOSIS — E11.9 TYPE 2 DIABETES MELLITUS WITHOUT COMPLICATIONS: ICD-10-CM

## 2025-04-24 DIAGNOSIS — R91.8 OTHER NONSPECIFIC ABNORMAL FINDING OF LUNG FIELD: ICD-10-CM

## 2025-04-24 DIAGNOSIS — R42 DIZZINESS AND GIDDINESS: ICD-10-CM

## 2025-04-24 DIAGNOSIS — I26.99 OTHER PULMONARY EMBOLISM WITHOUT ACUTE COR PULMONALE: ICD-10-CM

## 2025-04-24 LAB
A1C WITH ESTIMATED AVERAGE GLUCOSE RESULT: 8.1 % — HIGH (ref 4–5.6)
ANION GAP SERPL CALC-SCNC: 12 MMOL/L — SIGNIFICANT CHANGE UP (ref 5–17)
APPEARANCE UR: CLEAR — SIGNIFICANT CHANGE UP
BILIRUB UR-MCNC: NEGATIVE — SIGNIFICANT CHANGE UP
BUN SERPL-MCNC: 11 MG/DL — SIGNIFICANT CHANGE UP (ref 7–23)
CALCIUM SERPL-MCNC: 7.3 MG/DL — LOW (ref 8.4–10.5)
CHLORIDE SERPL-SCNC: 109 MMOL/L — HIGH (ref 96–108)
CO2 SERPL-SCNC: 19 MMOL/L — LOW (ref 22–31)
COLOR SPEC: YELLOW — SIGNIFICANT CHANGE UP
CREAT SERPL-MCNC: 0.6 MG/DL — SIGNIFICANT CHANGE UP (ref 0.5–1.3)
DIFF PNL FLD: NEGATIVE — SIGNIFICANT CHANGE UP
EGFR: 100 ML/MIN/1.73M2 — SIGNIFICANT CHANGE UP
EGFR: 100 ML/MIN/1.73M2 — SIGNIFICANT CHANGE UP
ESTIMATED AVERAGE GLUCOSE: 186 MG/DL — HIGH (ref 68–114)
GLUCOSE SERPL-MCNC: 254 MG/DL — HIGH (ref 70–99)
GLUCOSE UR QL: >=1000 MG/DL
HCT VFR BLD CALC: 29.8 % — LOW (ref 34.5–45)
HGB BLD-MCNC: 10.8 G/DL — LOW (ref 11.5–15.5)
INR BLD: 1.27 RATIO — HIGH (ref 0.85–1.16)
KETONES UR-MCNC: NEGATIVE MG/DL — SIGNIFICANT CHANGE UP
LEUKOCYTE ESTERASE UR-ACNC: NEGATIVE — SIGNIFICANT CHANGE UP
MCHC RBC-ENTMCNC: 28.3 PG — SIGNIFICANT CHANGE UP (ref 27–34)
MCHC RBC-ENTMCNC: 36.2 G/DL — HIGH (ref 32–36)
MCV RBC AUTO: 78 FL — LOW (ref 80–100)
NITRITE UR-MCNC: NEGATIVE — SIGNIFICANT CHANGE UP
NRBC BLD AUTO-RTO: 1 /100 WBCS — HIGH (ref 0–0)
PH UR: 6.5 — SIGNIFICANT CHANGE UP (ref 5–8)
PLATELET # BLD AUTO: 271 K/UL — SIGNIFICANT CHANGE UP (ref 150–400)
POTASSIUM SERPL-MCNC: 3.5 MMOL/L — SIGNIFICANT CHANGE UP (ref 3.5–5.3)
POTASSIUM SERPL-SCNC: 3.5 MMOL/L — SIGNIFICANT CHANGE UP (ref 3.5–5.3)
PROT UR-MCNC: NEGATIVE MG/DL — SIGNIFICANT CHANGE UP
PROTHROM AB SERPL-ACNC: 14.6 SEC — HIGH (ref 9.9–13.4)
RBC # BLD: 3.82 M/UL — SIGNIFICANT CHANGE UP (ref 3.8–5.2)
RBC # FLD: 16 % — HIGH (ref 10.3–14.5)
SODIUM SERPL-SCNC: 140 MMOL/L — SIGNIFICANT CHANGE UP (ref 135–145)
SP GR SPEC: 1.02 — SIGNIFICANT CHANGE UP (ref 1–1.03)
TSH SERPL-MCNC: 0.79 UIU/ML — SIGNIFICANT CHANGE UP (ref 0.27–4.2)
UROBILINOGEN FLD QL: 1 MG/DL — SIGNIFICANT CHANGE UP (ref 0.2–1)
WBC # BLD: 6.68 K/UL — SIGNIFICANT CHANGE UP (ref 3.8–10.5)
WBC # FLD AUTO: 6.68 K/UL — SIGNIFICANT CHANGE UP (ref 3.8–10.5)

## 2025-04-24 PROCEDURE — 93306 TTE W/DOPPLER COMPLETE: CPT | Mod: 26

## 2025-04-24 PROCEDURE — 71250 CT THORAX DX C-: CPT | Mod: 26

## 2025-04-24 RX ORDER — METFORMIN HYDROCHLORIDE 850 MG/1
500 TABLET ORAL
Refills: 0 | Status: DISCONTINUED | OUTPATIENT
Start: 2025-04-24 | End: 2025-04-24

## 2025-04-24 RX ORDER — CYST/ALA/Q10/PHOS.SER/DHA/BROC 100-20-50
1 POWDER (GRAM) ORAL DAILY
Refills: 0 | Status: DISCONTINUED | OUTPATIENT
Start: 2025-04-24 | End: 2025-04-25

## 2025-04-24 RX ORDER — INFLUENZA A VIRUS A/IDAHO/07/2018 (H1N1) ANTIGEN (MDCK CELL DERIVED, PROPIOLACTONE INACTIVATED, INFLUENZA A VIRUS A/INDIANA/08/2018 (H3N2) ANTIGEN (MDCK CELL DERIVED, PROPIOLACTONE INACTIVATED), INFLUENZA B VIRUS B/SINGAPORE/INFTT-16-0610/2016 ANTIGEN (MDCK CELL DERIVED, PROPIOLACTONE INACTIVATED), INFLUENZA B VIRUS B/IOWA/06/2017 ANTIGEN (MDCK CELL DERIVED, PROPIOLACTONE INACTIVATED) 15; 15; 15; 15 UG/.5ML; UG/.5ML; UG/.5ML; UG/.5ML
0.5 INJECTION, SUSPENSION INTRAMUSCULAR ONCE
Refills: 0 | Status: DISCONTINUED | OUTPATIENT
Start: 2025-04-24 | End: 2025-04-25

## 2025-04-24 RX ORDER — APIXABAN 2.5 MG/1
5 TABLET, FILM COATED ORAL EVERY 12 HOURS
Refills: 0 | Status: DISCONTINUED | OUTPATIENT
Start: 2025-04-24 | End: 2025-04-25

## 2025-04-24 RX ORDER — DAPAGLIFLOZIN 5 MG/1
10 TABLET, FILM COATED ORAL DAILY
Refills: 0 | Status: DISCONTINUED | OUTPATIENT
Start: 2025-04-24 | End: 2025-04-24

## 2025-04-24 RX ADMIN — INSULIN LISPRO 3: 100 INJECTION, SOLUTION INTRAVENOUS; SUBCUTANEOUS at 17:40

## 2025-04-24 RX ADMIN — INSULIN LISPRO 2: 100 INJECTION, SOLUTION INTRAVENOUS; SUBCUTANEOUS at 21:44

## 2025-04-24 RX ADMIN — Medication 1 TABLET(S): at 17:40

## 2025-04-24 RX ADMIN — INSULIN LISPRO 3: 100 INJECTION, SOLUTION INTRAVENOUS; SUBCUTANEOUS at 08:25

## 2025-04-24 RX ADMIN — Medication 100 MILLILITER(S): at 01:53

## 2025-04-24 RX ADMIN — INSULIN LISPRO 3: 100 INJECTION, SOLUTION INTRAVENOUS; SUBCUTANEOUS at 11:36

## 2025-04-24 RX ADMIN — APIXABAN 5 MILLIGRAM(S): 2.5 TABLET, FILM COATED ORAL at 05:40

## 2025-04-24 RX ADMIN — APIXABAN 5 MILLIGRAM(S): 2.5 TABLET, FILM COATED ORAL at 17:45

## 2025-04-24 NOTE — CONSULT NOTE ADULT - PROBLEM SELECTOR RECOMMENDATION 2
New, Uncontrolled   Start Metformin 500 bid   Farxiga 10 daily   check UA, Protein/Cr ratio  Low carb and sugar diet counseling  Outpt initiation of Ozempic New, Uncontrolled   Start Metformin 500 bid and glipizide 5  will start Farxiga 10 daily as outpt   check UA, Protein/Cr ratio  Low carb and sugar diet counseling  Outpt initiation of Ozempic New, Uncontrolled   RISS  Start Metformin 500 bid   will start Farxiga 10 daily as outpt   check UA, Protein/Cr ratio  Low carb and sugar diet counseling  Outpt initiation of Ozempic

## 2025-04-24 NOTE — PATIENT PROFILE ADULT - FALL HARM RISK - HARM RISK INTERVENTIONS
EKG was performed today per order written by Dr. Hill.  Name and  verified with patient.    Missyjuanita BlackmonASAEL weiss 2021 12:14 PM       Communicate Risk of Fall with Harm to all staff/Reinforce activity limits and safety measures with patient and family/Tailored Fall Risk Interventions/Visual Cue: Yellow wristband and red socks/Bed in lowest position, wheels locked, appropriate side rails in place/Call bell, personal items and telephone in reach/Instruct patient to call for assistance before getting out of bed or chair/Non-slip footwear when patient is out of bed/[place] to call system/Physically safe environment - no spills, clutter or unnecessary equipment/Purposeful Proactive Rounding/Room/bathroom lighting operational, light cord in reach

## 2025-04-25 ENCOUNTER — TRANSCRIPTION ENCOUNTER (OUTPATIENT)
Age: 65
End: 2025-04-25

## 2025-04-25 VITALS
TEMPERATURE: 99 F | HEART RATE: 83 BPM | RESPIRATION RATE: 18 BRPM | SYSTOLIC BLOOD PRESSURE: 120 MMHG | DIASTOLIC BLOOD PRESSURE: 75 MMHG

## 2025-04-25 DIAGNOSIS — E11.65 TYPE 2 DIABETES MELLITUS WITH HYPERGLYCEMIA: ICD-10-CM

## 2025-04-25 LAB
ANION GAP SERPL CALC-SCNC: 15 MMOL/L — SIGNIFICANT CHANGE UP (ref 5–17)
BUN SERPL-MCNC: 10 MG/DL — SIGNIFICANT CHANGE UP (ref 7–23)
CALCIUM SERPL-MCNC: 9.3 MG/DL — SIGNIFICANT CHANGE UP (ref 8.4–10.5)
CHLORIDE SERPL-SCNC: 100 MMOL/L — SIGNIFICANT CHANGE UP (ref 96–108)
CO2 SERPL-SCNC: 21 MMOL/L — LOW (ref 22–31)
CREAT SERPL-MCNC: 0.69 MG/DL — SIGNIFICANT CHANGE UP (ref 0.5–1.3)
EGFR: 96 ML/MIN/1.73M2 — SIGNIFICANT CHANGE UP
EGFR: 96 ML/MIN/1.73M2 — SIGNIFICANT CHANGE UP
GLUCOSE SERPL-MCNC: 299 MG/DL — HIGH (ref 70–99)
HCT VFR BLD CALC: 33.5 % — LOW (ref 34.5–45)
HGB BLD-MCNC: 12.1 G/DL — SIGNIFICANT CHANGE UP (ref 11.5–15.5)
MCHC RBC-ENTMCNC: 27.9 PG — SIGNIFICANT CHANGE UP (ref 27–34)
MCHC RBC-ENTMCNC: 36.1 G/DL — HIGH (ref 32–36)
MCV RBC AUTO: 77.4 FL — LOW (ref 80–100)
NRBC BLD AUTO-RTO: 2 /100 WBCS — HIGH (ref 0–0)
PLATELET # BLD AUTO: 313 K/UL — SIGNIFICANT CHANGE UP (ref 150–400)
POTASSIUM SERPL-MCNC: 4 MMOL/L — SIGNIFICANT CHANGE UP (ref 3.5–5.3)
POTASSIUM SERPL-SCNC: 4 MMOL/L — SIGNIFICANT CHANGE UP (ref 3.5–5.3)
RBC # BLD: 4.33 M/UL — SIGNIFICANT CHANGE UP (ref 3.8–5.2)
RBC # FLD: 16 % — HIGH (ref 10.3–14.5)
SODIUM SERPL-SCNC: 136 MMOL/L — SIGNIFICANT CHANGE UP (ref 135–145)
WBC # BLD: 6.05 K/UL — SIGNIFICANT CHANGE UP (ref 3.8–10.5)
WBC # FLD AUTO: 6.05 K/UL — SIGNIFICANT CHANGE UP (ref 3.8–10.5)

## 2025-04-25 PROCEDURE — 85025 COMPLETE CBC W/AUTO DIFF WBC: CPT

## 2025-04-25 PROCEDURE — 99285 EMERGENCY DEPT VISIT HI MDM: CPT

## 2025-04-25 PROCEDURE — 84443 ASSAY THYROID STIM HORMONE: CPT

## 2025-04-25 PROCEDURE — 81003 URINALYSIS AUTO W/O SCOPE: CPT

## 2025-04-25 PROCEDURE — 70450 CT HEAD/BRAIN W/O DYE: CPT | Mod: MC

## 2025-04-25 PROCEDURE — 85610 PROTHROMBIN TIME: CPT

## 2025-04-25 PROCEDURE — 85014 HEMATOCRIT: CPT

## 2025-04-25 PROCEDURE — 83605 ASSAY OF LACTIC ACID: CPT

## 2025-04-25 PROCEDURE — 82947 ASSAY GLUCOSE BLOOD QUANT: CPT

## 2025-04-25 PROCEDURE — 93005 ELECTROCARDIOGRAM TRACING: CPT

## 2025-04-25 PROCEDURE — 70496 CT ANGIOGRAPHY HEAD: CPT | Mod: MC

## 2025-04-25 PROCEDURE — 80048 BASIC METABOLIC PNL TOTAL CA: CPT

## 2025-04-25 PROCEDURE — 36415 COLL VENOUS BLD VENIPUNCTURE: CPT

## 2025-04-25 PROCEDURE — 70498 CT ANGIOGRAPHY NECK: CPT | Mod: MC

## 2025-04-25 PROCEDURE — 84484 ASSAY OF TROPONIN QUANT: CPT

## 2025-04-25 PROCEDURE — 82330 ASSAY OF CALCIUM: CPT

## 2025-04-25 PROCEDURE — 84295 ASSAY OF SERUM SODIUM: CPT

## 2025-04-25 PROCEDURE — 83036 HEMOGLOBIN GLYCOSYLATED A1C: CPT

## 2025-04-25 PROCEDURE — 84132 ASSAY OF SERUM POTASSIUM: CPT

## 2025-04-25 PROCEDURE — 82803 BLOOD GASES ANY COMBINATION: CPT

## 2025-04-25 PROCEDURE — 99222 1ST HOSP IP/OBS MODERATE 55: CPT

## 2025-04-25 PROCEDURE — 82962 GLUCOSE BLOOD TEST: CPT

## 2025-04-25 PROCEDURE — 87637 SARSCOV2&INF A&B&RSV AMP PRB: CPT

## 2025-04-25 PROCEDURE — 71045 X-RAY EXAM CHEST 1 VIEW: CPT

## 2025-04-25 PROCEDURE — 85027 COMPLETE CBC AUTOMATED: CPT

## 2025-04-25 PROCEDURE — 85730 THROMBOPLASTIN TIME PARTIAL: CPT

## 2025-04-25 PROCEDURE — 80053 COMPREHEN METABOLIC PANEL: CPT

## 2025-04-25 PROCEDURE — 82435 ASSAY OF BLOOD CHLORIDE: CPT

## 2025-04-25 PROCEDURE — 71250 CT THORAX DX C-: CPT | Mod: MC

## 2025-04-25 PROCEDURE — C8929: CPT

## 2025-04-25 PROCEDURE — 85018 HEMOGLOBIN: CPT

## 2025-04-25 RX ORDER — METFORMIN HYDROCHLORIDE 850 MG/1
1 TABLET ORAL
Qty: 180 | Refills: 0
Start: 2025-04-25 | End: 2025-07-23

## 2025-04-25 RX ORDER — CYST/ALA/Q10/PHOS.SER/DHA/BROC 100-20-50
1 POWDER (GRAM) ORAL
Refills: 0 | DISCHARGE

## 2025-04-25 RX ORDER — ISOPROPYL ALCOHOL, BENZOCAINE .7; .06 ML/ML; ML/ML
0 SWAB TOPICAL
Qty: 100 | Refills: 1
Start: 2025-04-25

## 2025-04-25 RX ORDER — CYST/ALA/Q10/PHOS.SER/DHA/BROC 100-20-50
1 POWDER (GRAM) ORAL
Qty: 0 | Refills: 0 | DISCHARGE
Start: 2025-04-25

## 2025-04-25 RX ORDER — APIXABAN 2.5 MG/1
1 TABLET, FILM COATED ORAL
Qty: 0 | Refills: 0 | DISCHARGE
Start: 2025-04-25

## 2025-04-25 RX ADMIN — APIXABAN 5 MILLIGRAM(S): 2.5 TABLET, FILM COATED ORAL at 17:31

## 2025-04-25 RX ADMIN — APIXABAN 5 MILLIGRAM(S): 2.5 TABLET, FILM COATED ORAL at 05:22

## 2025-04-25 RX ADMIN — INSULIN LISPRO 3: 100 INJECTION, SOLUTION INTRAVENOUS; SUBCUTANEOUS at 11:47

## 2025-04-25 RX ADMIN — INSULIN LISPRO 1: 100 INJECTION, SOLUTION INTRAVENOUS; SUBCUTANEOUS at 17:31

## 2025-04-25 RX ADMIN — Medication 1 TABLET(S): at 11:47

## 2025-04-25 RX ADMIN — INSULIN LISPRO 3: 100 INJECTION, SOLUTION INTRAVENOUS; SUBCUTANEOUS at 07:57

## 2025-04-25 NOTE — CONSULT NOTE ADULT - ASSESSMENT
64y F PMH PE on Eliquis, hemoglobin SC dz  presents for c/o  dizziness since 10am today, intermittent CP and hyperglycemia being admitted        Dizziness.   ·  Plan: - C/o dizziness since 10am, intermittent CP, hyperglycemia    - EKG: NSR   - Labs: cbc unrevealing, chem w/hyperglycemia, trop neg   - CXR: clear   - CTA H/N: neg for ICH, no significant stenosis or occlusion   - S/p code stroke, which was canceled as sx  attributed to hyperglycemia   -Telemetry   - Check Echo (ordered)   - Cardio consult following    Angina pectoris.   ·  Plan: As above.    DM2 (diabetes mellitus, type 2).   ·  Plan: - Pt w/hyperglycemia, glucosuria, no know prior hx/o DM   - No acidosis or hypocarbia , Anion gap, DKA less likely   - Pt likely with new onset DM2   - Low ISS ACHS for now. Further insulin recs based on sliding scale requirement   - CC diet  - Check A1c 8.1  - Endocrine consult      Lung nodules.   ·  Plan: - CTA H/N showing  6-7 mm irregular noncalcified nodule right upper lobe, of   indeterminate neoplastic potential  - CT chest ordered to further eval.    Pulmonary embolism.   ·  Plan: - hx/o PE on Eliquis   - C/w Eliquis.  
63 y/o F w/ newly diagnosed Type 2 DM w/ hyperglycemia 
64y F PMH PE on Eliquis, hemoglobin SC dz  presents for c/o  dizziness since 10am today, intermittent CP and hyperglycemia being admitted

## 2025-04-25 NOTE — DIETITIAN INITIAL EVALUATION ADULT - PHYSCIAL ASSESSMENT
Drug Dosing Weight  Height (cm): 165 (23 Apr 2025 16:14)  Weight (kg): 90 (23 Apr 2025 16:51)  BMI (kg/m2): 33.1 (23 Apr 2025 16:51)    Weight history:   Per pt: 190lb something/86kg, denies weight changes PTA   [facility] HIE: 88.4kg (4/23/25), 81.6kg (10/19/24)    -  RD will continue to monitor wt trends as available/able.     IBW: 138lb (adjusted for BMI >30), 143% IBW

## 2025-04-25 NOTE — PROVIDER CONTACT NOTE (OTHER) - BACKGROUND
Patient admitted for syncope and collapse  PMH PE on eliquis, hyperglycemia, irregular noncalcified nodule

## 2025-04-25 NOTE — DISCHARGE NOTE PROVIDER - NSFOLLOWUPCLINICSTOKEN_GEN_ALL_ED_FT
853031:2 weeks|| ||00\01||False;028024:Routine|| ||00\01||False;

## 2025-04-25 NOTE — DISCHARGE NOTE NURSING/CASE MANAGEMENT/SOCIAL WORK - FINANCIAL ASSISTANCE
[facility] provides services at a reduced cost to those who are determined to be eligible through [facility]’s financial assistance program. Information regarding [facility]’s financial assistance program can be found by going to [email or web address] or by calling [phone].

## 2025-04-25 NOTE — DIETITIAN INITIAL EVALUATION ADULT - PERTINENT MEDS FT
MEDICATIONS  (STANDING):  apixaban 5 milliGRAM(s) Oral every 12 hours  dextrose 5%. 1000 milliLiter(s) (100 mL/Hr) IV Continuous <Continuous>  dextrose 5%. 1000 milliLiter(s) (50 mL/Hr) IV Continuous <Continuous>  dextrose 50% Injectable 25 Gram(s) IV Push once  dextrose 50% Injectable 12.5 Gram(s) IV Push once  dextrose 50% Injectable 25 Gram(s) IV Push once  glucagon  Injectable 1 milliGRAM(s) IntraMuscular once  influenza  Vaccine (HIGH DOSE) 0.5 milliLiter(s) IntraMuscular once  insulin lispro (ADMELOG) corrective regimen sliding scale   SubCutaneous three times a day before meals  insulin lispro (ADMELOG) corrective regimen sliding scale   SubCutaneous at bedtime  multivitamin/minerals 1 Tablet(s) Oral daily  sodium chloride 0.9%. 1000 milliLiter(s) (100 mL/Hr) IV Continuous <Continuous>    MEDICATIONS  (PRN):  acetaminophen     Tablet .. 650 milliGRAM(s) Oral every 6 hours PRN Temp greater or equal to 38C (100.4F), Mild Pain (1 - 3)  aluminum hydroxide/magnesium hydroxide/simethicone Suspension 30 milliLiter(s) Oral every 4 hours PRN Dyspepsia  dextrose Oral Gel 15 Gram(s) Oral once PRN Blood Glucose LESS THAN 70 milliGRAM(s)/deciliter  melatonin 3 milliGRAM(s) Oral at bedtime PRN Insomnia  ondansetron Injectable 4 milliGRAM(s) IV Push every 8 hours PRN Nausea and/or Vomiting

## 2025-04-25 NOTE — DIETITIAN INITIAL EVALUATION ADULT - PERTINENT LABORATORY DATA
04-25    136  |  100  |  10  ----------------------------<  299[H]  4.0   |  21[L]  |  0.69    Ca    9.3      25 Apr 2025 06:29    TPro  7.7  /  Alb  4.3  /  TBili  0.8  /  DBili  x   /  AST  27  /  ALT  24  /  AlkPhos  79  04-23    CAPILLARY BLOOD GLUCOSE  POCT Blood Glucose.: 274 mg/dL (25 Apr 2025 11:31)  POCT Blood Glucose.: 297 mg/dL (25 Apr 2025 07:21)  POCT Blood Glucose.: 269 mg/dL (25 Apr 2025 04:01)  POCT Blood Glucose.: 341 mg/dL (24 Apr 2025 21:23)  POCT Blood Glucose.: 358 mg/dL (24 Apr 2025 21:21)  POCT Blood Glucose.: 255 mg/dL (24 Apr 2025 17:11)    A1C with Estimated Average Glucose Result: 8.1 % (04-23-25 @ 17:21)

## 2025-04-25 NOTE — DIETITIAN INITIAL EVALUATION ADULT - EDUCATION DIETARY MODIFICATIONS
Provided education on nutrition therapy for Diabetes including sources of carbohydrates, choosing whole grains vs refined carbohydrates, portion sizes, pairing protein with carbohydrates for glycemic control, limiting refined sugars in diet, using measuring cups for carbohydrate counting and the importance of consistent eating pattern to help optimize glycemic control./teach back/(2) meets goals/outcomes/verbalization

## 2025-04-25 NOTE — DIETITIAN INITIAL EVALUATION ADULT - NSFNSGIASSESSMENTFT_GEN_A_CORE
denies GI distress, last BM on 4/24 per flowsheet, not on any bowel regimen. Ordered for Simethicone and Zofran.

## 2025-04-25 NOTE — DISCHARGE NOTE PROVIDER - CARE PROVIDER_API CALL
Donavon Spears  Cardiology  [address] [place] [address], [address]  [place] [address]  Phone: [phone]  Fax: [phone]  Follow Up Time: 1 week

## 2025-04-25 NOTE — DISCHARGE NOTE PROVIDER - NSFOLLOWUPCLINICS_GEN_ALL_ED_FT
[facility] Endocrinology  Endocrinology  [address] [place] [address]  Phone: [phone]  Fax:   Follow Up Time: 2 weeks    [facility] Ophthalmology  Ophthalmology  [address]  [place] [address]  Phone: [phone]  Fax:   Follow Up Time: Routine

## 2025-04-25 NOTE — PROGRESS NOTE ADULT - PROBLEM SELECTOR PLAN 2
New, Uncontrolled   RISS  Start Metformin 500 bid   will start Farxiga 10 daily as outpt .UA noted   check UA, Protein/Cr ratio  Low carb and sugar diet counseling  Outpt initiation of Ozempic.

## 2025-04-25 NOTE — DISCHARGE NOTE PROVIDER - NSDCMRMEDTOKEN_GEN_ALL_CORE_FT
apixaban 5 mg oral tablet: 1 tab(s) orally every 12 hours  Multivitamin with Minerals: 1 tab(s) orally once a day   apixaban 5 mg oral tablet: 1 tab(s) orally every 12 hours  metFORMIN 500 mg oral tablet: 1 tab(s) orally 2 times a day  Multiple Vitamins with Minerals oral tablet: 1 tab(s) orally once a day

## 2025-04-25 NOTE — DISCHARGE NOTE PROVIDER - HOSPITAL COURSE
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 Pt went to   where she was noted to have d urine with glucose but no ketones. Pt states she checked her blood sugar over the past week and it  has been between 300-500. Pt denies prior diagnosis of diabetes. Pt also reports intermittent chest pain at baseline.  S/p stroke code in ED that was ultimately canceled as sx though to be related poor glucose control     ROS: Denies HA, SOB, palpitation, N/V/D, fever, cough, chills, abm pain, recent travel, sick contact, change in bowel or urinary habits   A 10-system ROS was performed and is negative except as noted above and/or in the HPI.   (23 Apr 2025 23:33)    Hospital Course:  Patient was admitted for dizziness , intermittent CP and hyperglycemia symptoms likely due to new uncontrolled DM2 . Endo, cardiology, and nutritional consulted on this admission , started on Metformin 500 bid . Low carb and sugar diet counseling    Important Medication Changes and Reason:  start metformin   Active or Pending Issues Requiring Follow-up:  f/u with endo, pcp, cards  Advanced Directives:   [ x] Full code  [ ] DNR  [ ] Hospice    Discharge Diagnoses:  New uncontrolled DM2 (diabetes mellitus, type 2).   Hx Pulmonary embolism

## 2025-04-25 NOTE — PROGRESS NOTE ADULT - ASSESSMENT
64y F PMH PE on Eliquis, hemoglobin SC dz  presents for c/o  dizziness since 10am today, intermittent CP and hyperglycemia being admitted        Dizziness.   ·  Plan: - C/o dizziness since 10am, intermittent CP, hyperglycemia    - EKG: NSR   - Labs: cbc unrevealing, chem w/hyperglycemia, trop neg   - CXR: clear   - CTA H/N: neg for ICH, no significant stenosis or occlusion   - S/p code stroke, which was canceled as sx  attributed to hyperglycemia   -Telemetry   -  Echo done  - Cardio consult following    Angina pectoris.   ·  Plan: As above.    DM2 (diabetes mellitus, type 2).   ·  Plan: - Pt w/hyperglycemia, glucosuria, no know prior hx/o DM   - No acidosis or hypocarbia , Anion gap, DKA less likely   - Pt likely with new onset DM2   - Low ISS ACHS for now. Further insulin recs based on sliding scale requirement   - CC diet  - Check A1c 8.1  - Endocrine follow up as out pt  - Metformin 500 bid   - Farxiga 10 daily as outpt       Lung nodules.   ·  Plan: - CTA H/N showing  6-7 mm irregular noncalcified nodule right upper lobe, of   indeterminate neoplastic potential  - CT chest done  - follow up as out pt    Pulmonary embolism.   ·  Plan: - hx/o PE on Eliquis   - C/w Eliquis.    cleared for discharged  - follow up with Dr Spears
64y F PMH PE on Eliquis, hemoglobin SC dz  presents for c/o  dizziness since 10am today, intermittent CP and hyperglycemia being admitted

## 2025-04-25 NOTE — DIETITIAN INITIAL EVALUATION ADULT - ORAL INTAKE PTA/DIET HISTORY
Pt confirms no known food allergies, was eating well with no changes in appetite. Denies chewing or swallowing issues. Denies GI distress. Denies protein supplement use, on MVI per H&P.

## 2025-04-25 NOTE — PROGRESS NOTE ADULT - SUBJECTIVE AND OBJECTIVE BOX
DATE OF SERVICE: 04-25-25 @ 09:49    Patient is a 64y old  Female who presents with a chief complaint of syncope (24 Apr 2025 10:02)      SUBJECTIVE / OVERNIGHT EVENTS:  No chest pain. No shortness of breath. No complaints. No events overnight.     MEDICATIONS  (STANDING):  apixaban 5 milliGRAM(s) Oral every 12 hours  dextrose 5%. 1000 milliLiter(s) (100 mL/Hr) IV Continuous <Continuous>  dextrose 5%. 1000 milliLiter(s) (50 mL/Hr) IV Continuous <Continuous>  dextrose 50% Injectable 25 Gram(s) IV Push once  dextrose 50% Injectable 12.5 Gram(s) IV Push once  dextrose 50% Injectable 25 Gram(s) IV Push once  glucagon  Injectable 1 milliGRAM(s) IntraMuscular once  influenza  Vaccine (HIGH DOSE) 0.5 milliLiter(s) IntraMuscular once  insulin lispro (ADMELOG) corrective regimen sliding scale   SubCutaneous three times a day before meals  insulin lispro (ADMELOG) corrective regimen sliding scale   SubCutaneous at bedtime  multivitamin/minerals 1 Tablet(s) Oral daily  sodium chloride 0.9%. 1000 milliLiter(s) (100 mL/Hr) IV Continuous <Continuous>    MEDICATIONS  (PRN):  acetaminophen     Tablet .. 650 milliGRAM(s) Oral every 6 hours PRN Temp greater or equal to 38C (100.4F), Mild Pain (1 - 3)  aluminum hydroxide/magnesium hydroxide/simethicone Suspension 30 milliLiter(s) Oral every 4 hours PRN Dyspepsia  dextrose Oral Gel 15 Gram(s) Oral once PRN Blood Glucose LESS THAN 70 milliGRAM(s)/deciliter  melatonin 3 milliGRAM(s) Oral at bedtime PRN Insomnia  ondansetron Injectable 4 milliGRAM(s) IV Push every 8 hours PRN Nausea and/or Vomiting      Vital Signs Last 24 Hrs  T(C): 36.6 (25 Apr 2025 04:16), Max: 36.8 (24 Apr 2025 16:29)  T(F): 97.8 (25 Apr 2025 04:16), Max: 98.3 (24 Apr 2025 16:29)  HR: 78 (25 Apr 2025 04:16) (65 - 78)  BP: 122/80 (25 Apr 2025 04:16) (116/73 - 131/85)  BP(mean): --  RR: 18 (25 Apr 2025 04:16) (18 - 18)  SpO2: 99% (25 Apr 2025 04:16) (97% - 99%)    Parameters below as of 25 Apr 2025 04:16  Patient On (Oxygen Delivery Method): room air      CAPILLARY BLOOD GLUCOSE      POCT Blood Glucose.: 297 mg/dL (25 Apr 2025 07:21)  POCT Blood Glucose.: 269 mg/dL (25 Apr 2025 04:01)  POCT Blood Glucose.: 341 mg/dL (24 Apr 2025 21:23)  POCT Blood Glucose.: 358 mg/dL (24 Apr 2025 21:21)  POCT Blood Glucose.: 255 mg/dL (24 Apr 2025 17:11)  POCT Blood Glucose.: 256 mg/dL (24 Apr 2025 11:15)    I&O's Summary    24 Apr 2025 07:01  -  25 Apr 2025 07:00  --------------------------------------------------------  IN: 480 mL / OUT: 150 mL / NET: 330 mL        PHYSICAL EXAM:  GENERAL: NAD, well-developed  HEAD:  Atraumatic, Normocephalic  EYES: EOMI, PERRLA, conjunctiva and sclera clear  NECK: Supple, No JVD  CHEST/LUNG: Clear to auscultation bilaterally; No wheeze  HEART: Regular rate and rhythm; No murmurs, rubs, or gallops  ABDOMEN: Soft, Nontender, Nondistended; Bowel sounds present  EXTREMITIES:  2+ Peripheral Pulses, No clubbing, cyanosis, or edema  PSYCH: AAOx3  NEUROLOGY: non-focal  SKIN: No rashes or lesions    LABS:                        12.1   6.05  )-----------( 313      ( 25 Apr 2025 06:29 )             33.5     04-25    136  |  100  |  10  ----------------------------<  299[H]  4.0   |  21[L]  |  0.69    Ca    9.3      25 Apr 2025 06:29    TPro  7.7  /  Alb  4.3  /  TBili  0.8  /  DBili  x   /  AST  27  /  ALT  24  /  AlkPhos  79  04-23    PT/INR - ( 24 Apr 2025 06:25 )   PT: 14.6 sec;   INR: 1.27 ratio         PTT - ( 23 Apr 2025 16:35 )  PTT:27.2 sec      Urinalysis Basic - ( 25 Apr 2025 06:29 )    Color: x / Appearance: x / SG: x / pH: x  Gluc: 299 mg/dL / Ketone: x  / Bili: x / Urobili: x   Blood: x / Protein: x / Nitrite: x   Leuk Esterase: x / RBC: x / WBC x   Sq Epi: x / Non Sq Epi: x / Bacteria: x    < from: CT Chest No Cont (04.24.25 @ 14:46) >    INTERPRETATION:  CLINICAL INFORMATION: Follow-up evaluation for lung   nodules demonstrated on CT neck 4/23/2025.    COMPARISON: Chest radiograph 4/23/2025. CT chest abdomen and pelvis   10/19/2024.    CONTRAST/COMPLICATIONS:  IV Contrast: NONE  Oral Contrast: NONE    PROCEDURE:  CT of the Chest was performed.  Sagittal and coronal reformats were performed.    FINDINGS:    LUNGS AND LARGE AIRWAYS: Patent central airways. Subsegmental atelectasis   of the right middle lobe, lingula, and bilateral lower lobes. A 9 mm   groundglass nodular opacity of the right upper lobe (3-31). Two right   lung apex pulmonary nodules, a 9 mm nodule (3-19) don 7 mm nodule   (6-104). Other scattered sub-5 mm bilateral pulmonary nodules are noted.  PLEURA: No pleural effusion.  VESSELS: Within normal limits.  HEART: Heart size is normal. No pericardial effusion.  MEDIASTINUM AND HALLEY: No lymphadenopathy.  CHEST WALL AND LOWER NECK: Within normal limits.  VISUALIZED UPPER ABDOMEN: Sludge is noted within the gallbladder. 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  BONES: Sclerotic foci of the T7 and T11 vertebral bodies , likely   secondary to patient's known Hb SC disease. Degenerative changes.    IMPRESSION:    Bilateral pulmonary nodules, as described above, including a 9 mm   groundglass nodular opacity in the right upper lobe. Recommend interval   follow-up with noncontrast CT to evaluate for stability in 1 year.      --- End of Report ---      < end of copied text >  < from: TTE W or WO Ultrasound Enhancing Agent (04.24.25 @ 07:14) >  CONCLUSIONS:      1. Left ventricular cavity is normal in size. Left ventricular systolic function is normal with an ejection fraction of 65 % by Wheeler's method of disks. There are no regional wall motion abnormalities seen.   2. Normal right ventricular cavity size and normal right ventricular systolic function.   3. Estimated pulmonary artery systolic pressure is 24 mmHg.   4. No significant valvular disease.   5. No pericardial effusion seen.   6. Compared to the transthoracic echocardiogram performed on 10/20/2024, there have been no significant interval changes.      < end of copied text >      RADIOLOGY & ADDITIONAL TESTS:    Imaging Personally Reviewed:    Consultant(s) Notes Reviewed:      Care Discussed with Consultants/Other Providers:  
Subjective: Patient seen and examined. No new events except as noted.     REVIEW OF SYSTEMS:    CONSTITUTIONAL: +weakness, fevers or chills  EYES/ENT: No visual changes;  No vertigo or throat pain   NECK: No pain or stiffness  RESPIRATORY: No cough, wheezing, hemoptysis; No shortness of breath  CARDIOVASCULAR: No chest pain or palpitations  GASTROINTESTINAL: No abdominal or epigastric pain. No nausea, vomiting, or hematemesis; No diarrhea or constipation. No melena or hematochezia.  GENITOURINARY: No dysuria, frequency or hematuria  NEUROLOGICAL: No numbness or weakness  SKIN: No itching, burning, rashes, or lesions   All other review of systems is negative unless indicated above.    MEDICATIONS:  MEDICATIONS  (STANDING):  apixaban 5 milliGRAM(s) Oral every 12 hours  dextrose 5%. 1000 milliLiter(s) (100 mL/Hr) IV Continuous <Continuous>  dextrose 5%. 1000 milliLiter(s) (50 mL/Hr) IV Continuous <Continuous>  dextrose 50% Injectable 25 Gram(s) IV Push once  dextrose 50% Injectable 12.5 Gram(s) IV Push once  dextrose 50% Injectable 25 Gram(s) IV Push once  glucagon  Injectable 1 milliGRAM(s) IntraMuscular once  influenza  Vaccine (HIGH DOSE) 0.5 milliLiter(s) IntraMuscular once  insulin lispro (ADMELOG) corrective regimen sliding scale   SubCutaneous three times a day before meals  insulin lispro (ADMELOG) corrective regimen sliding scale   SubCutaneous at bedtime  multivitamin/minerals 1 Tablet(s) Oral daily  sodium chloride 0.9%. 1000 milliLiter(s) (100 mL/Hr) IV Continuous <Continuous>      PHYSICAL EXAM:  T(C): 36.6 (04-25-25 @ 10:55), Max: 36.8 (04-24-25 @ 16:29)  HR: 88 (04-25-25 @ 10:55) (69 - 88)  BP: 125/80 (04-25-25 @ 10:55) (116/73 - 128/80)  RR: 18 (04-25-25 @ 10:55) (18 - 18)  SpO2: 98% (04-25-25 @ 10:55) (97% - 99%)  Wt(kg): --  I&O's Summary    24 Apr 2025 07:01  -  25 Apr 2025 07:00  --------------------------------------------------------  IN: 480 mL / OUT: 150 mL / NET: 330 mL          Appearance: Normal	  HEENT:   Normal oral mucosa, PERRL, EOMI	  Lymphatic: No lymphadenopathy , no edema  Cardiovascular: Normal S1 S2, No JVD, No murmurs , Peripheral pulses palpable 2+ bilaterally  Respiratory: Lungs clear to auscultation, normal effort 	  Gastrointestinal:  Soft, Non-tender, + BS	  Skin: No rashes, No ecchymoses, No cyanosis, warm to touch  Musculoskeletal: Normal range of motion, normal strength  Psychiatry:  Mood & affect appropriate  Ext: No edema      LABS:    CARDIAC MARKERS:                                12.1   6.05  )-----------( 313      ( 25 Apr 2025 06:29 )             33.5     04-25    136  |  100  |  10  ----------------------------<  299[H]  4.0   |  21[L]  |  0.69    Ca    9.3      25 Apr 2025 06:29    TPro  7.7  /  Alb  4.3  /  TBili  0.8  /  DBili  x   /  AST  27  /  ALT  24  /  AlkPhos  79  04-23    Urinalysis with Rflx Culture (04.24.25 @ 10:05)   Urine Appearance: Clear  Color: Yellow  Specific Gravity: 1.018  pH Urine: 6.5  Protein, Urine: Negative mg/dL  Glucose Qualitative, Urine: >=1000 mg/dL  Ketone - Urine: Negative mg/dL  Blood, Urine: Negative  Bilirubin: Negative  Urobilinogen: 1.0 mg/dL  Leukocyte Esterase Concentration: Negative  Nitrite: Negative      TELEMETRY: SR 	    ECG:  	  RADIOLOGY:  < from: CT Chest No Cont (04.24.25 @ 14:46) >    ACC: [id number] EXAM:  CT CHEST   ORDERED BY:  CRYS OSEGUERA     PROCEDURE DATE:  04/24/2025          INTERPRETATION:  CLINICAL INFORMATION: Follow-up evaluation for lung   nodules demonstrated on CT neck 4/23/2025.    COMPARISON: Chest radiograph 4/23/2025. CT chest abdomen and pelvis   10/19/2024.    CONTRAST/COMPLICATIONS:  IV Contrast: NONE  Oral Contrast: NONE    PROCEDURE:  CT of the Chest was performed.  Sagittal and coronal reformats were performed.    FINDINGS:    LUNGS AND LARGE AIRWAYS: Patent central airways. Subsegmental atelectasis   of the right middle lobe, lingula, and bilateral lower lobes. A 9 mm   groundglass nodular opacity of the right upper lobe (3-31). Two right   lung apex pulmonary nodules, a 9 mm nodule (3-19) don 7 mm nodule   (6-104). Other scattered sub-5 mm bilateral pulmonary nodules are noted.  PLEURA: No pleural effusion.  VESSELS: Within normal limits.  HEART: Heart size is normal. No pericardial effusion.  MEDIASTINUM AND HALLEY: No lymphadenopathy.  CHEST WALL AND LOWER NECK: Within normal limits.  VISUALIZED UPPER ABDOMEN: Sludge is noted within the gallbladder. 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  BONES: Sclerotic foci of the T7 and T11 vertebral bodies , likely   secondary to patient's known Hb SC disease. Degenerative changes.    IMPRESSION:    Bilateral pulmonary nodules, as described above, including a 9 mm   groundglass nodular opacity in the right upper lobe. Recommend interval   follow-up with noncontrast CT to evaluate for stability in 1 year.      --- End of Report ---          < end of copied text >    DIAGNOSTIC TESTING:  [ ] Echocardiogram:  [ ]  Catheterization:  [ ] Stress Test:    OTHER:

## 2025-04-25 NOTE — CONSULT NOTE ADULT - PROBLEM SELECTOR RECOMMENDATION 9
Likely due to hyperglycemia   check TTE though recent one in Office from November 2024 was normal   monitor on tele   check orthostatics
Diabetes Education and Nutrition Eval  Discussed dietary and lifestyle modifications at length. Pt. seem motivated to eliminated soda and jucie and will cut back on carbs and sweets.  While in-patient can start:  Lantus 10 units qhs and Admelog 3 units qac  Low correction scale qac + bedtime  Goal glucose 100-180  Outpt. endo follow-up  Outpt. optho, podiatry, micro/cr  Plan to d/c on metformin 500mg BID with titration as indicated based on response as outpatient in addition to dietary and lifestyle modifications.     Discussed with primary team.      Pavel Wallis D.O  [phone]

## 2025-04-25 NOTE — DISCHARGE NOTE NURSING/CASE MANAGEMENT/SOCIAL WORK - PATIENT PORTAL LINK FT
You can access the FollowMyHealth Patient Portal offered by [facility] by registering at the following website: [email or web address]. By joining [facility]’s FollowMyHealth portal, you will also be able to view your health information using other applications (apps) compatible with our system.

## 2025-04-25 NOTE — DIETITIAN INITIAL EVALUATION ADULT - OTHER INFO
- HbA1c 8.1 on 4/23/25, new DM per chart, ordered for ISS to regulate blood glucose in house   - Micronutrient/Other supplementation: MVI

## 2025-04-25 NOTE — DISCHARGE NOTE PROVIDER - NSDCCPCAREPLAN_GEN_ALL_CORE_FT
PRINCIPAL DISCHARGE DIAGNOSIS  Diagnosis: DM2 (diabetes mellitus, type 2)  Assessment and Plan of Treatment: You endorsed you drinks a lot of soda and juices. Eats a lot of carbs.   Discussed dietary and lifestyle modifications at length. You seem motivated to eliminated soda and jucie and will cut back on carbs and sweets.  Goal glucose 100-180  Outpt. endo follow-up  Outpt. optho, podiatry, micro/cr  Plan to d/c on metformin 500mg BID with titration as indicated based on response as outpatient in addition to dietary and lifestyle modifications.         SECONDARY DISCHARGE DIAGNOSES  Diagnosis: Dizziness  Assessment and Plan of Treatment: Likely due to hyperglycemia   follow up with Dr. Spears in office monday.

## 2025-04-25 NOTE — CONSULT NOTE ADULT - SUBJECTIVE AND OBJECTIVE BOX
04-24-25 @ 10:03  64y F PMH PE on Eliquis, hemoglobin SC dz  presents with EMS for dizziness since 10am today. Pt went to   where she was noted to have d urine with glucose but no ketones. Pt states she checked her blood sugar over the past week and it  has been between 300-500. Pt denies prior diagnosis of diabetes. Pt also reports intermittent chest pain at baseline.  S/p stroke code in ED that was ultimately canceled as sx though to be related poor glucose control     ROS: Denies HA, SOB, palpitation, N/V/D, fever, cough, chills, abm pain, recent travel, sick contact, change in bowel or urinary habits   A 10-system ROS was performed and is negative except as noted above and/or in the HPI.        PAST MEDICAL & SURGICAL HISTORY:  Pleural effusion      Pulmonary embolus      Hemoglobin SC disease      No significant past surgical history          Review of Systems:   CONSTITUTIONAL: No fever, weight loss, or fatigue  EYES: No eye pain, visual disturbances, or discharge  ENMT:  No difficulty hearing, tinnitus, vertigo; No sinus or throat pain  NECK: No pain or stiffness  BREASTS: No pain, masses, or nipple discharge  RESPIRATORY: No cough, wheezing, chills or hemoptysis; No shortness of breath  CARDIOVASCULAR: No chest pain, palpitations, dizziness, or leg swelling  GASTROINTESTINAL: No abdominal or epigastric pain. No nausea, vomiting, or hematemesis; No diarrhea or constipation. No melena or hematochezia.  GENITOURINARY: No dysuria, frequency, hematuria, or incontinence  NEUROLOGICAL: No headaches, memory loss, loss of strength, numbness, or tremors  SKIN: No itching, burning, rashes, or lesions   LYMPH NODES: No enlarged glands  ENDOCRINE: No heat or cold intolerance; No hair loss  MUSCULOSKELETAL: No joint pain or swelling; No muscle, back, or extremity pain  PSYCHIATRIC: No depression, anxiety, mood swings, or difficulty sleeping  HEME/LYMPH: No easy bruising, or bleeding gums  ALLERY AND IMMUNOLOGIC: No hives or eczema    Allergies    No Known Allergies    Intolerances        Social History:     FAMILY HISTORY:  No pertinent family history in first degree relatives        MEDICATIONS  (STANDING):  apixaban 5 milliGRAM(s) Oral every 12 hours  dapagliflozin 10 milliGRAM(s) Oral daily  dextrose 5%. 1000 milliLiter(s) (100 mL/Hr) IV Continuous <Continuous>  dextrose 5%. 1000 milliLiter(s) (50 mL/Hr) IV Continuous <Continuous>  dextrose 50% Injectable 25 Gram(s) IV Push once  dextrose 50% Injectable 12.5 Gram(s) IV Push once  dextrose 50% Injectable 25 Gram(s) IV Push once  glucagon  Injectable 1 milliGRAM(s) IntraMuscular once  influenza  Vaccine (HIGH DOSE) 0.5 milliLiter(s) IntraMuscular once  insulin lispro (ADMELOG) corrective regimen sliding scale   SubCutaneous three times a day before meals  insulin lispro (ADMELOG) corrective regimen sliding scale   SubCutaneous at bedtime  metFORMIN 500 milliGRAM(s) Oral two times a day  multivitamin/minerals 1 Tablet(s) Oral daily  sodium chloride 0.9%. 1000 milliLiter(s) (100 mL/Hr) IV Continuous <Continuous>    MEDICATIONS  (PRN):  acetaminophen     Tablet .. 650 milliGRAM(s) Oral every 6 hours PRN Temp greater or equal to 38C (100.4F), Mild Pain (1 - 3)  aluminum hydroxide/magnesium hydroxide/simethicone Suspension 30 milliLiter(s) Oral every 4 hours PRN Dyspepsia  dextrose Oral Gel 15 Gram(s) Oral once PRN Blood Glucose LESS THAN 70 milliGRAM(s)/deciliter  melatonin 3 milliGRAM(s) Oral at bedtime PRN Insomnia  ondansetron Injectable 4 milliGRAM(s) IV Push every 8 hours PRN Nausea and/or Vomiting      Vital Signs Last 24 Hrs  T(C): 36.6 (24 Apr 2025 05:06), Max: 36.8 (23 Apr 2025 19:45)  T(F): 97.8 (24 Apr 2025 05:06), Max: 98.2 (23 Apr 2025 19:45)  HR: 68 (24 Apr 2025 05:06) (65 - 72)  BP: 120/79 (24 Apr 2025 05:06) (120/79 - 178/93)  BP(mean): --  RR: 18 (24 Apr 2025 05:06) (18 - 20)  SpO2: 98% (24 Apr 2025 05:06) (97% - 99%)    Parameters below as of 24 Apr 2025 05:06  Patient On (Oxygen Delivery Method): room air      CAPILLARY BLOOD GLUCOSE      POCT Blood Glucose.: 300 mg/dL (24 Apr 2025 08:05)  POCT Blood Glucose.: 302 mg/dL (23 Apr 2025 22:48)  POCT Blood Glucose.: 210 mg/dL (23 Apr 2025 16:15)    I&O's Summary      PHYSICAL EXAM:  GENERAL: NAD, well-developed  HEAD:  Atraumatic, Normocephalic  EYES: EOMI, PERRLA, conjunctiva and sclera clear  NECK: Supple, No JVD  CHEST/LUNG: Clear to auscultation bilaterally; No wheeze  HEART: Regular rate and rhythm; No murmurs, rubs, or gallops  ABDOMEN: Soft, Nontender, Nondistended; Bowel sounds present  EXTREMITIES:  2+ Peripheral Pulses, No clubbing, cyanosis, or edema  PSYCH: AAOx3  NEUROLOGY: non-focal  SKIN: No rashes or lesions    LABS:                        10.8   6.68  )-----------( 271      ( 24 Apr 2025 06:25 )             29.8     04-24    140  |  109[H]  |  11  ----------------------------<  254[H]  3.5   |  19[L]  |  0.60    Ca    7.3[L]      24 Apr 2025 06:25    TPro  7.7  /  Alb  4.3  /  TBili  0.8  /  DBili  x   /  AST  27  /  ALT  24  /  AlkPhos  79  04-23    PT/INR - ( 24 Apr 2025 06:25 )   PT: 14.6 sec;   INR: 1.27 ratio         PTT - ( 23 Apr 2025 16:35 )  PTT:27.2 sec      Urinalysis Basic - ( 24 Apr 2025 06:25 )    Color: x / Appearance: x / SG: x / pH: x  Gluc: 254 mg/dL / Ketone: x  / Bili: x / Urobili: x   Blood: x / Protein: x / Nitrite: x   Leuk Esterase: x / RBC: x / WBC x   Sq Epi: x / Non Sq Epi: x / Bacteria: x        RADIOLOGY & ADDITIONAL TESTS:    Imaging Personally Reviewed:    Consultant(s) Notes Reviewed:      Care Discussed with Consultants/Other Providers:  
    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 Pt went to   where she was noted to have urine with glucose but no ketones. Pt states she checked her blood sugar over the past week and it  has been between 300-500. Pt denies prior diagnosis of diabetes. Pt also reports intermittent chest pain at baseline.  S/p stroke code in ED that was ultimately canceled as sx though to be related poor glucose control   Denies ever being told of diabetes. No recent steroids. Drinks a lot of soda and juices. Eats a lot of carbs. Has noticed polyuria, polydipsia, paresthesias, blurry vision over the past week. Mother with hx of Type 2 DM.       PAST MEDICAL & SURGICAL HISTORY:  Pleural effusion      Pulmonary embolus      Hemoglobin SC disease      No significant past surgical history          FAMILY HISTORY:  Mother- Type 2 DM        Social History: No tobacco or alcohol use    Outpatient Medications:  Eliquis    MEDICATIONS  (STANDING):  apixaban 5 milliGRAM(s) Oral every 12 hours  dextrose 5%. 1000 milliLiter(s) (100 mL/Hr) IV Continuous <Continuous>  dextrose 5%. 1000 milliLiter(s) (50 mL/Hr) IV Continuous <Continuous>  dextrose 50% Injectable 25 Gram(s) IV Push once  dextrose 50% Injectable 12.5 Gram(s) IV Push once  dextrose 50% Injectable 25 Gram(s) IV Push once  glucagon  Injectable 1 milliGRAM(s) IntraMuscular once  influenza  Vaccine (HIGH DOSE) 0.5 milliLiter(s) IntraMuscular once  insulin lispro (ADMELOG) corrective regimen sliding scale   SubCutaneous three times a day before meals  insulin lispro (ADMELOG) corrective regimen sliding scale   SubCutaneous at bedtime  multivitamin/minerals 1 Tablet(s) Oral daily  sodium chloride 0.9%. 1000 milliLiter(s) (100 mL/Hr) IV Continuous <Continuous>    MEDICATIONS  (PRN):  acetaminophen     Tablet .. 650 milliGRAM(s) Oral every 6 hours PRN Temp greater or equal to 38C (100.4F), Mild Pain (1 - 3)  aluminum hydroxide/magnesium hydroxide/simethicone Suspension 30 milliLiter(s) Oral every 4 hours PRN Dyspepsia  dextrose Oral Gel 15 Gram(s) Oral once PRN Blood Glucose LESS THAN 70 milliGRAM(s)/deciliter  melatonin 3 milliGRAM(s) Oral at bedtime PRN Insomnia  ondansetron Injectable 4 milliGRAM(s) IV Push every 8 hours PRN Nausea and/or Vomiting      Allergies    No Known Allergies    Intolerances      Review of Systems:  Constitutional: No fever, No change in weight  Eyes: + blurry vision  Neuro: No headache, + paresthesias  HEENT: No throat pain  Cardiovascular: No chest pain  Respiratory: No SOB  GI: No nausea or vomiting  : + polyuria  Skin: no rash  Psych: no depression  Endocrine: + polydipsia, No heat or cold intolerance, rest as noted in HPI  Hem/lymph: no swelling    All other review of systems negative      PHYSICAL EXAM:  VITALS: T(C): 36.6 (04-25-25 @ 10:55)  T(F): 97.9 (04-25-25 @ 10:55), Max: 98.3 (04-24-25 @ 16:29)  HR: 88 (04-25-25 @ 10:55) (69 - 88)  BP: 125/80 (04-25-25 @ 10:55) (116/73 - 128/80)  RR:  (18 - 18)  SpO2:  (97% - 99%)  Wt(kg): --  GENERAL: NAD at this time  EYES: No proptosis, EOMI  HEENT:  Atraumatic, Normocephalic,   THYROID: Normal size, no palpable nodules  RESPIRATORY: Clear to auscultation bilaterally, full excursion, non-labored  CARDIOVASCULAR: Regular rhythm; No murmurs; no peripheral edema  GI: Soft, nontender, non distended, normal bowel sounds  SKIN: Dry, intact, No rashes or lesions  MUSCULOSKELETAL: normal strength  NEURO: follows commands, no tremor, normal reflexes  PSYCH: Alert and oriented x 3, normal affect, normal mood  CUSHING'S SIGNS: no striae      POCT Blood Glucose.: 274 mg/dL (04-25-25 @ 11:31)  POCT Blood Glucose.: 297 mg/dL (04-25-25 @ 07:21)  POCT Blood Glucose.: 269 mg/dL (04-25-25 @ 04:01)  POCT Blood Glucose.: 341 mg/dL (04-24-25 @ 21:23)  POCT Blood Glucose.: 358 mg/dL (04-24-25 @ 21:21)  POCT Blood Glucose.: 255 mg/dL (04-24-25 @ 17:11)  POCT Blood Glucose.: 256 mg/dL (04-24-25 @ 11:15)  POCT Blood Glucose.: 300 mg/dL (04-24-25 @ 08:05)  POCT Blood Glucose.: 302 mg/dL (04-23-25 @ 22:48)  POCT Blood Glucose.: 210 mg/dL (04-23-25 @ 16:15)                              12.1   6.05  )-----------( 313      ( 25 Apr 2025 06:29 )             33.5       04-25    136  |  100  |  10  ----------------------------<  299[H]  4.0   |  21[L]  |  0.69    eGFR: 96    Ca    9.3      04-25    TPro  7.7  /  Alb  4.3  /  TBili  0.8  /  DBili  x   /  AST  27  /  ALT  24  /  AlkPhos  79  04-23    Thyroid Function Tests:  04-23 @ 16:36 TSH 0.79 FreeT4 -- T3 -- Anti TPO -- Anti Thyroglobulin Ab -- TSI --            Radiology:             
CHIEF COMPLAINT:    HISTORY OF PRESENT ILLNESS:    64y F well known to me from office PMH PE on Eliquis, hemoglobin SC dz  presents with EMS for dizziness since 10am today. Pt went to   where she was noted to have d urine with glucose but no ketones. Pt states she checked her blood sugar over the past week and it  has been between 300-500. Pt denies prior diagnosis of diabetes. Pt also reports intermittent chest pain at baseline.  S/p stroke code in ED that was ultimately canceled as sx though to be related poor glucose control     ROS: Denies HA, SOB, palpitation, N/V/D, fever, cough, chills, abm pain, recent travel, sick contact, change in bowel or urinary habits   A 10-system ROS was performed and is negative except as noted above and/or in the HPI.      PAST MEDICAL & SURGICAL HISTORY:  Pleural effusion      Pulmonary embolus      Hemoglobin SC disease      No significant past surgical history              MEDICATIONS:  apixaban 5 milliGRAM(s) Oral every 12 hours        acetaminophen     Tablet .. 650 milliGRAM(s) Oral every 6 hours PRN  melatonin 3 milliGRAM(s) Oral at bedtime PRN  ondansetron Injectable 4 milliGRAM(s) IV Push every 8 hours PRN    aluminum hydroxide/magnesium hydroxide/simethicone Suspension 30 milliLiter(s) Oral every 4 hours PRN    dextrose 50% Injectable 25 Gram(s) IV Push once  dextrose 50% Injectable 12.5 Gram(s) IV Push once  dextrose 50% Injectable 25 Gram(s) IV Push once  dextrose Oral Gel 15 Gram(s) Oral once PRN  glucagon  Injectable 1 milliGRAM(s) IntraMuscular once  insulin lispro (ADMELOG) corrective regimen sliding scale   SubCutaneous three times a day before meals  insulin lispro (ADMELOG) corrective regimen sliding scale   SubCutaneous at bedtime    dextrose 5%. 1000 milliLiter(s) IV Continuous <Continuous>  dextrose 5%. 1000 milliLiter(s) IV Continuous <Continuous>  influenza  Vaccine (HIGH DOSE) 0.5 milliLiter(s) IntraMuscular once  multivitamin/minerals 1 Tablet(s) Oral daily  sodium chloride 0.9%. 1000 milliLiter(s) IV Continuous <Continuous>      FAMILY HISTORY:  No pertinent family history in first degree relatives        SOCIAL HISTORY:    [ ] Non-smoker  [ ] Smoker  [ ] Alcohol    Allergies    No Known Allergies    Intolerances    	    REVIEW OF SYSTEMS:  CONSTITUTIONAL: No fever, weight loss, + fatigue  EYES: No eye pain, visual disturbances, or discharge  ENMT:  No difficulty hearing, tinnitus, vertigo; No sinus or throat pain  NECK: No pain or stiffness  RESPIRATORY: No cough, wheezing, chills or hemoptysis; No Shortness of Breath  CARDIOVASCULAR: No chest pain, palpitations, passing out, dizziness, or leg swelling  GASTROINTESTINAL: No abdominal or epigastric pain. No nausea, vomiting, or hematemesis; No diarrhea or constipation. No melena or hematochezia.  GENITOURINARY: No dysuria, frequency, hematuria, or incontinence  NEUROLOGICAL: No headaches, memory loss, loss of strength, numbness, or tremors  SKIN: No itching, burning, rashes, or lesions   LYMPH Nodes: No enlarged glands  ENDOCRINE: No heat or cold intolerance; No hair loss  MUSCULOSKELETAL: No joint pain or swelling; No muscle, back, or extremity pain  PSYCHIATRIC: No depression, anxiety, mood swings, or difficulty sleeping  HEME/LYMPH: No easy bruising, or bleeding gums  ALLERY AND IMMUNOLOGIC: No hives or eczema	    [ ] All others negative	  [ ] Unable to obtain    PHYSICAL EXAM:  T(C): 36.6 (04-24-25 @ 05:06), Max: 36.8 (04-23-25 @ 19:45)  HR: 68 (04-24-25 @ 05:06) (65 - 72)  BP: 120/79 (04-24-25 @ 05:06) (120/79 - 178/93)  RR: 18 (04-24-25 @ 05:06) (18 - 20)  SpO2: 98% (04-24-25 @ 05:06) (97% - 99%)  Wt(kg): --  I&O's Summary      Appearance: NAD  HEENT:   Dry oral mucosa, PERRL, EOMI	  Lymphatic: No lymphadenopathy  Cardiovascular: Normal S1 S2, No JVD, No murmurs, No edema  Respiratory: Lungs clear to auscultation	  Psychiatry: A & O x 3, Mood & affect appropriate  Gastrointestinal:  Soft, Non-tender, + BS	  Skin: No rashes, No ecchymoses, No cyanosis	  Neurologic: Non-focal  Extremities: Normal range of motion, No clubbing, cyanosis or edema  Vascular: Peripheral pulses palpable 2+ bilaterally    TELEMETRY: SR 	    ECG:  	  RADIOLOGY:  < from: CT Angio Neck Stroke Protocol w/ IV Cont (04.23.25 @ 16:53) >  ACC: [id number] EXAM:  CT ANGIO BRAIN STROKE PROTC IC   ORDERED BY:    CRYS WITT     ACC: [id number] EXAM:  CT ANGIO NECK STROKE PROTCL IC   ORDERED BY:    CRYS WITT     ACC: [id number] EXAM:  CT BRAIN STROKE PROTOCOL   ORDERED BY:  CRYS WITT     PROCEDURE DATE:  04/23/2025          INTERPRETATION:  CLINICAL INFORMATION: Stroke code. Clinical concern for   CVA. Altered mentation. Dizziness.    COMPARISON: None.    CONTRAST:  IV Contrast: NONE  .    TECHNIQUE:  CT BRAIN: Serial axial images were obtained from the skull base to the   vertex without the use of contrast.    CTA NECK/HEAD: After the intravenous power injection of non-ionic   contrast material, serial thin sections were obtained through the   cervical and intracranial circulation on a multislice CT scanner. Axial,   Coronal and Sagittal MIP reformatted images were obtained. 3D   reconstruction was also performed.    FINDINGS:    CT BRAIN:    VENTRICLES AND SULCI: Normal in size and configuration. No hydrocephalus.  INTRA-AXIAL: No mass effect, acute hemorrhage, or midline shift.  No   compelling evidence of acute territorial infarct.  EXTRA-AXIAL: No mass or fluid collection. Basal cisterns are normal in   appearance.    VISUALIZED SINUSES:  Clear.  TYMPANOMASTOID CAVITIES:  Clear.  VISUALIZED ORBITS: Normal.  CALVARIUM: Intact. Subcentimeter osteoma contiguous with the outer table   left frontal calvarium.    MISCELLANEOUS: Partial empty sella.    CTA NECK:    Artifact related to dental amalgam partially obscures the regional   structures.    AORTIC ARCH AND VISUALIZED GREAT VESSELS: Unremarkable.    RIGHT:  COMMON CAROTID ARTERY: No significant stenosis to the carotid bifurcation.  INTERNAL CAROTID ARTERY: No significant stenosis based on NASCET   criteria. Calcified plaque at its origin.  VERTEBRAL ARTERY: Patent and dominant.    LEFT:  COMMON CAROTID ARTERY: No significant stenosis to the carotid bifurcation.  INTERNAL CAROTID ARTERY: No significant stenosis based on NASCET   criteria. Calcified plaque at its origin.  VERTEBRAL ARTERY: Patent and nondominant.    VISUALIZED LUNGS: Branching opacity and 6-7 mm noncalcified, irregular   nodule right upper lobe.    MISCELLANEOUS: Degenerative change visualized spine.    CAROTID STENOSIS REFERENCE: Percent (%) stenosis is expressed in terms of   NASCET Criteria. (NASCET = 100x1-(N/D)). N=greatest narrowing. D=normal   distal diameter - MILD = <50% stenosis. - MODERATE = 50-69% stenosis. -   SEVERE = 70-89% stenosis. - HAIRLINE/CRITICAL = 90-99% stenosis. -   OCCLUDED = 100% stenosis.    CTA HEAD:    INTERNAL CAROTID ARTERIES: Bilateral scattered calcified plaque. No   occlusion or significant stenosis.    [place] OF DIANE: No definitive aneurysm identified. Tiny aneurysms can   be beyond the resolution of CTA technique.    ANTERIOR CEREBRAL ARTERIES: No proximal occlusion or significant stenosis.  MIDDLE CEREBRAL ARTERIES: No proximal occlusion or significant stenosis.  POSTERIOR CEREBRAL ARTERIES: No proximal occlusion or significant   stenosis. Bilateral posterior communicating arteries are visualized.    DISTAL VERTEBRAL / BASILAR ARTERIES: No significant stenosis or occlusion.    VENOUS STRUCTURES: No visualized filling defect to suggest thrombus.    MISCELLANEOUS: No othervascular abnormality is seen.    IMPRESSION:    CT HEAD:  1. No evidence of acute hemorrhage, hydrocephalus or vasogenic edema.  2. If clinical symptoms persist consider further imaging with MRI,   provided there are no medical contraindications.    CTA NECK:  1. Bilateral vertebral arteries patent.  2. No significant stenosis, occlusion or dissection bilateral   extracranial carotid arteries.  3. Additional findings described in detail above, including a branching   opacity and 6-7 mm irregular noncalcified nodule right upper lobe, of   indeterminate neoplastic potential. Recommend nonemergent noncontrast CT   chest for further evaluation.    CTA HEAD:  1. No large vessel occlusion.  2. Additional findings described in detail above.    Results of CT brain discussed with Dr. Witt at 4:43 PM the day of this   exam.    --- End of Report ---            DENI NICHOLS M.D., ATTENDING RADIOLOGIST  This document has been electronically signed. Apr 23 2025  5:10PM    < end of copied text >  < from: Xray Chest 1 View AP/PA (04.23.25 @ 17:53) >    ACC: [id number] EXAM:  XR CHEST AP OR PA 1V   ORDERED BY:  CRYS WITT     PROCEDURE DATE:  04/23/2025          INTERPRETATION:  CLINICAL INFORMATION: Dizziness    TECHNIQUE: Frontal view(s) of the chest.    COMPARISON: Chest radiograph 10/19/2024    FINDINGS:    The heart size cannot be accurately assessed on this projection.  No focal consolidation, pleural effusion or pneumothorax.  No acute osseous abnormality.    IMPRESSION:  Clear lungs.    --- End of Report ---           VANESA HUGO; Resident Radiologist  This document has been electronically signed.  ASHLEY ALVA MD; Attending Radiologist  This document has been electronically signed. Apr 24 2025  8:47AM    < end of copied text >    OTHER: 	  	  LABS:	 	    CARDIAC MARKERS:        FluA/FluB/RSV/COVID PCR (04.23.25 @ 17:21)    SARS-CoV-2 Result: NotDetec    Influenza A Result: NotDetec    Influenza B Result: NotDetec    Resp Syn Virus Result: NotDetec    Source Respiratory: Nasopharyngeal                            10.8   6.68  )-----------( 271      ( 24 Apr 2025 06:25 )             29.8     04-24    140  |  109[H]  |  11  ----------------------------<  254[H]  3.5   |  19[L]  |  0.60    Ca    7.3[L]      24 Apr 2025 06:25    TPro  7.7  /  Alb  4.3  /  TBili  0.8  /  DBili  x   /  AST  27  /  ALT  24  /  AlkPhos  79  04-23    proBNP:   Lipid Profile:   HgA1c:   TSH: Thyroid Stimulating Hormone, Serum: 0.79 uIU/mL (04-23 @ 16:36)

## 2025-04-25 NOTE — DIETITIAN INITIAL EVALUATION ADULT - ADD RECOMMEND
-- Monitor PO intake, GI tolerance, skin integrity, labs, weight, and bowel movement regularity.   -- Honor dietary preferences as expressed as able.

## 2025-04-25 NOTE — PHARMACOTHERAPY INTERVENTION NOTE - COMMENTS
MEDICATIONS  (STANDING):  apixaban 5 milliGRAM(s) Oral every 12 hours  insulin lispro (ADMELOG) corrective regimen sliding scale   SubCutaneous three times a day before meals  insulin lispro (ADMELOG) corrective regimen sliding scale   SubCutaneous at bedtime  multivitamin/minerals 1 Tablet(s) Oral daily    Counseled patient on the new medications that patient may start for outpatient. Informed patients the indication, directions and side effects of the medications. Medication listed below. Educated patient on reason for insulin use (high A1C, meaning of A1C, A1C goal), pathophysiology/role of insulin in our body, complications of uncontrolled DM and signs of hypoglycemia and what to do in event of hypoglycemia.     Printed patient's medication list/schedule on [email or web address] And provided to patient at bedside which reviews indication, scheduled time, and possible side effects of the meds. Patient questions and concerns were answered and addressed. Patient demonstrated understanding.     Zheng (Young Purcell) Adam - PharmD, BCPS  Transitions of Care Pharmacist  Available on [employer] Teams (Preferred)

## 2025-05-08 ENCOUNTER — EMERGENCY (EMERGENCY)
Facility: HOSPITAL | Age: 65
LOS: 1 days | End: 2025-05-08
Attending: EMERGENCY MEDICINE
Payer: COMMERCIAL

## 2025-05-08 VITALS
TEMPERATURE: 98 F | SYSTOLIC BLOOD PRESSURE: 179 MMHG | OXYGEN SATURATION: 100 % | HEIGHT: 64.96 IN | WEIGHT: 222.01 LBS | RESPIRATION RATE: 22 BRPM | DIASTOLIC BLOOD PRESSURE: 113 MMHG | HEART RATE: 106 BPM

## 2025-05-08 LAB
ALBUMIN SERPL ELPH-MCNC: 4.3 G/DL — SIGNIFICANT CHANGE UP (ref 3.3–5)
ALP SERPL-CCNC: 70 U/L — SIGNIFICANT CHANGE UP (ref 40–120)
ALT FLD-CCNC: 25 U/L — SIGNIFICANT CHANGE UP (ref 10–45)
ANION GAP SERPL CALC-SCNC: 13 MMOL/L — SIGNIFICANT CHANGE UP (ref 5–17)
ANISOCYTOSIS BLD QL: SLIGHT — SIGNIFICANT CHANGE UP
APPEARANCE UR: CLEAR — SIGNIFICANT CHANGE UP
AST SERPL-CCNC: 36 U/L — SIGNIFICANT CHANGE UP (ref 10–40)
BACTERIA # UR AUTO: NEGATIVE /HPF — SIGNIFICANT CHANGE UP
BASOPHILS # BLD AUTO: 0 K/UL — SIGNIFICANT CHANGE UP (ref 0–0.2)
BASOPHILS NFR BLD AUTO: 0 % — SIGNIFICANT CHANGE UP (ref 0–2)
BILIRUB SERPL-MCNC: 0.8 MG/DL — SIGNIFICANT CHANGE UP (ref 0.2–1.2)
BILIRUB UR-MCNC: NEGATIVE — SIGNIFICANT CHANGE UP
BUN SERPL-MCNC: 17 MG/DL — SIGNIFICANT CHANGE UP (ref 7–23)
CALCIUM SERPL-MCNC: 9.5 MG/DL — SIGNIFICANT CHANGE UP (ref 8.4–10.5)
CAST: 1 /LPF — SIGNIFICANT CHANGE UP (ref 0–4)
CHLORIDE SERPL-SCNC: 108 MMOL/L — SIGNIFICANT CHANGE UP (ref 96–108)
CO2 SERPL-SCNC: 21 MMOL/L — LOW (ref 22–31)
COLOR SPEC: YELLOW — SIGNIFICANT CHANGE UP
CREAT SERPL-MCNC: 0.84 MG/DL — SIGNIFICANT CHANGE UP (ref 0.5–1.3)
DACRYOCYTES BLD QL SMEAR: SLIGHT — SIGNIFICANT CHANGE UP
DIFF PNL FLD: NEGATIVE — SIGNIFICANT CHANGE UP
EGFR: 77 ML/MIN/1.73M2 — SIGNIFICANT CHANGE UP
EGFR: 77 ML/MIN/1.73M2 — SIGNIFICANT CHANGE UP
EOSINOPHIL # BLD AUTO: 0 K/UL — SIGNIFICANT CHANGE UP (ref 0–0.5)
EOSINOPHIL NFR BLD AUTO: 0 % — SIGNIFICANT CHANGE UP (ref 0–6)
GLUCOSE SERPL-MCNC: 173 MG/DL — HIGH (ref 70–99)
GLUCOSE UR QL: >=1000 MG/DL
HCT VFR BLD CALC: 32.7 % — LOW (ref 34.5–45)
HGB BLD-MCNC: 11.9 G/DL — SIGNIFICANT CHANGE UP (ref 11.5–15.5)
KETONES UR-MCNC: NEGATIVE MG/DL — SIGNIFICANT CHANGE UP
LEUKOCYTE ESTERASE UR-ACNC: ABNORMAL
LYMPHOCYTES # BLD AUTO: 3.95 K/UL — HIGH (ref 1–3.3)
LYMPHOCYTES # BLD AUTO: 31.2 % — SIGNIFICANT CHANGE UP (ref 13–44)
MACROCYTES BLD QL: SLIGHT — SIGNIFICANT CHANGE UP
MANUAL SMEAR VERIFICATION: SIGNIFICANT CHANGE UP
MCHC RBC-ENTMCNC: 28.1 PG — SIGNIFICANT CHANGE UP (ref 27–34)
MCHC RBC-ENTMCNC: 36.4 G/DL — HIGH (ref 32–36)
MCV RBC AUTO: 77.1 FL — LOW (ref 80–100)
MONOCYTES # BLD AUTO: 0.34 K/UL — SIGNIFICANT CHANGE UP (ref 0–0.9)
MONOCYTES NFR BLD AUTO: 2.7 % — SIGNIFICANT CHANGE UP (ref 2–14)
MYELOCYTES NFR BLD: 0.9 % — HIGH (ref 0–0)
NEUTROPHILS # BLD AUTO: 8.25 K/UL — HIGH (ref 1.8–7.4)
NEUTROPHILS NFR BLD AUTO: 65.2 % — SIGNIFICANT CHANGE UP (ref 43–77)
NITRITE UR-MCNC: NEGATIVE — SIGNIFICANT CHANGE UP
NRBC # BLD: 2 /100 WBCS — HIGH (ref 0–0)
NRBC BLD-RTO: 2 /100 WBCS — HIGH (ref 0–0)
PH UR: 7.5 — SIGNIFICANT CHANGE UP (ref 5–8)
PLAT MORPH BLD: NORMAL — SIGNIFICANT CHANGE UP
PLATELET # BLD AUTO: 337 K/UL — SIGNIFICANT CHANGE UP (ref 150–400)
POIKILOCYTOSIS BLD QL AUTO: SIGNIFICANT CHANGE UP
POLYCHROMASIA BLD QL SMEAR: SLIGHT — SIGNIFICANT CHANGE UP
POTASSIUM SERPL-MCNC: 4.3 MMOL/L — SIGNIFICANT CHANGE UP (ref 3.5–5.3)
POTASSIUM SERPL-SCNC: 4.3 MMOL/L — SIGNIFICANT CHANGE UP (ref 3.5–5.3)
PROT SERPL-MCNC: 7.9 G/DL — SIGNIFICANT CHANGE UP (ref 6–8.3)
PROT UR-MCNC: NEGATIVE MG/DL — SIGNIFICANT CHANGE UP
RBC # BLD: 4.24 M/UL — SIGNIFICANT CHANGE UP (ref 3.8–5.2)
RBC # FLD: 16.4 % — HIGH (ref 10.3–14.5)
RBC BLD AUTO: ABNORMAL
RBC CASTS # UR COMP ASSIST: 0 /HPF — SIGNIFICANT CHANGE UP (ref 0–4)
SODIUM SERPL-SCNC: 142 MMOL/L — SIGNIFICANT CHANGE UP (ref 135–145)
SP GR SPEC: >1.03 — HIGH (ref 1–1.03)
SQUAMOUS # UR AUTO: 1 /HPF — SIGNIFICANT CHANGE UP (ref 0–5)
TARGETS BLD QL SMEAR: SIGNIFICANT CHANGE UP
UROBILINOGEN FLD QL: 1 MG/DL — SIGNIFICANT CHANGE UP (ref 0.2–1)
WBC # BLD: 12.66 K/UL — HIGH (ref 3.8–10.5)
WBC # FLD AUTO: 12.66 K/UL — HIGH (ref 3.8–10.5)
WBC UR QL: 5 /HPF — SIGNIFICANT CHANGE UP (ref 0–5)

## 2025-05-08 PROCEDURE — 96374 THER/PROPH/DIAG INJ IV PUSH: CPT | Mod: XU

## 2025-05-08 PROCEDURE — 85025 COMPLETE CBC W/AUTO DIFF WBC: CPT

## 2025-05-08 PROCEDURE — 80053 COMPREHEN METABOLIC PANEL: CPT

## 2025-05-08 PROCEDURE — 74177 CT ABD & PELVIS W/CONTRAST: CPT | Mod: 26

## 2025-05-08 PROCEDURE — 99285 EMERGENCY DEPT VISIT HI MDM: CPT | Mod: 25

## 2025-05-08 PROCEDURE — 87077 CULTURE AEROBIC IDENTIFY: CPT

## 2025-05-08 PROCEDURE — 96375 TX/PRO/DX INJ NEW DRUG ADDON: CPT

## 2025-05-08 PROCEDURE — 93005 ELECTROCARDIOGRAM TRACING: CPT

## 2025-05-08 PROCEDURE — 99285 EMERGENCY DEPT VISIT HI MDM: CPT

## 2025-05-08 PROCEDURE — 93010 ELECTROCARDIOGRAM REPORT: CPT

## 2025-05-08 PROCEDURE — 81001 URINALYSIS AUTO W/SCOPE: CPT

## 2025-05-08 PROCEDURE — 87086 URINE CULTURE/COLONY COUNT: CPT

## 2025-05-08 PROCEDURE — 74177 CT ABD & PELVIS W/CONTRAST: CPT

## 2025-05-08 RX ORDER — SODIUM CHLORIDE 9 G/1000ML
1000 INJECTION, SOLUTION INTRAVENOUS ONCE
Refills: 0 | Status: COMPLETED | OUTPATIENT
Start: 2025-05-08 | End: 2025-05-08

## 2025-05-08 RX ORDER — ACETAMINOPHEN 500 MG/5ML
1000 LIQUID (ML) ORAL ONCE
Refills: 0 | Status: COMPLETED | OUTPATIENT
Start: 2025-05-08 | End: 2025-05-08

## 2025-05-08 RX ORDER — ONDANSETRON HCL/PF 4 MG/2 ML
4 VIAL (ML) INJECTION ONCE
Refills: 0 | Status: COMPLETED | OUTPATIENT
Start: 2025-05-08 | End: 2025-05-08

## 2025-05-08 RX ORDER — KETOROLAC TROMETHAMINE 30 MG/ML
15 INJECTION, SOLUTION INTRAMUSCULAR; INTRAVENOUS ONCE
Refills: 0 | Status: DISCONTINUED | OUTPATIENT
Start: 2025-05-08 | End: 2025-05-08

## 2025-05-08 RX ADMIN — KETOROLAC TROMETHAMINE 15 MILLIGRAM(S): 30 INJECTION, SOLUTION INTRAMUSCULAR; INTRAVENOUS at 22:49

## 2025-05-08 RX ADMIN — SODIUM CHLORIDE 2000 MILLILITER(S): 9 INJECTION, SOLUTION INTRAVENOUS at 21:37

## 2025-05-08 RX ADMIN — Medication 400 MILLIGRAM(S): at 21:34

## 2025-05-08 RX ADMIN — Medication 4 MILLIGRAM(S): at 21:37

## 2025-05-08 NOTE — ED PROVIDER NOTE - PHYSICAL EXAMINATION
Gen: AAOx3, non-toxic, uncomfortable appearing,   Head: NCAT  HEENT: EOMI, oral mucosa moist, normal conjunctiva  Lung: CTAB, no respiratory distress, no wheezes/rhonchi/rales B/L,   CV: RRR, no murmurs, rubs or gallops  Abd: soft, NTND, no guarding, moderate L CVA ttp  MSK: no visible deformities  Neuro: No focal sensory or motor deficits  Skin: Warm, well perfused, no rash  Psych: normal affect.

## 2025-05-08 NOTE — ED ADULT NURSE NOTE - OBJECTIVE STATEMENT
Break coverage RN 2130 - 2200. 64y F BIB self accompanied by spouse p/w left flank pain. Pt is axo4, ambulates independently at baseline.Mentions sudden onset of what the pt describes as hip pain but pt is primarily pointing to left flank on exam with radiation down left leg, all starting around 12pm. Denies taking OTC pain medications prior to coming in to the ED. Denies headache, dizziness, vision changes, chest pain, shortness of breath, abdominal pain, vomiting, diarrhea, fevers, chills, dysuria, hematuria, recent travel or fall. Patient undressed and placed into gown, call bell in hand and side rails up with bed in lowest position for safety. blanket provided. Comfort and safety provided.

## 2025-05-08 NOTE — ED PROVIDER NOTE - PROGRESS NOTE DETAILS
Attending Stuart Whaley:  Patient signed out to me. hemodynam stable,. here with left hip/flank pain, CT w/o acute abnormality, but did comment on unchanged appearance/suggestion of RIGHT hip avascular necrosis. Patient is on Ac. Has full str/rom/neurovasc intact RLE/LLE with soft compartments. rec continue ac, f/u outpt with ortho. Patient feels somewhat improved. Able to ambulate at baseline. Results endorsed including unexpected incidental findings (copy of reports provided to patient). Return precautions given. Patient expressed verbal understanding. All questions answered.  Will DC with outpatient follow-up.     I have fully participated in the care of this patient from the time of signout. I have made amendments to the documentation that were entered from the time of signout where appropriate and have supervised the ongoing plan of care enacted by the Fellow/Resident/ACP/Student.

## 2025-05-08 NOTE — ED PROVIDER NOTE - PATIENT PORTAL LINK FT
You can access the FollowMyHealth Patient Portal offered by [facility] by registering at the following website: [email or web address]. By joining [facility]’s FollowMyHealth portal, you will also be able to view your health information using other applications (apps) compatible with our system.

## 2025-05-08 NOTE — ED PROVIDER NOTE - OBJECTIVE STATEMENT
64-year-old female past medical history of PE on Eliquis, hypertension hyperlipidemia presents ED complaining of sudden onset left flank pain, stabbing in nature, rating to left lower suprapubic area, denies recent trauma, denies dysuria hematuria fevers chills chest pain shortness of breath abdominal pain dysuria hematuria.  Exacerbated with certain movements.  Normal bowel movements, is passing gas.

## 2025-05-08 NOTE — ED PROVIDER NOTE - ATTENDING CONTRIBUTION TO CARE
------------ATTENDING NOTE------------  pt w/   c/o 24 hrs of constant waxing/waning moderate/severe stabbing pain in L flank radiating to L side/suprapubic area, nausea, no fevers, no change in BM/stools, no numbness/weakness, has equal distal pulses, awaiting labs /imaging /close reassessments -->  - Adolfo Barnard MD   ------------------------------------------------------ ------------ATTENDING NOTE------------  pt w/   c/o 24 hrs of constant waxing/waning moderate/severe stabbing pain in L flank radiating to L side/suprapubic area, nausea, no fevers, no change in BM/stools, no numbness/weakness, has equal distal pulses, awaiting labs /imaging /close reassessments --> ED Sign out 2300 pending CT, close reassessments for tx / dispo decisions.  - Adolfo Barnard MD   ------------------------------------------------------

## 2025-05-08 NOTE — ED PROVIDER NOTE - NSFOLLOWUPINSTRUCTIONS_ED_ALL_ED_FT
YOU WERE SEEN IN THE ED FOR: left sided leg/flank/abdominal pain    YOUR CT SCAN DID NOT SHOW ANY EMERGENT FINDINGS ON THE LEFT BUT IT DID SHOW:  1. 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 SEE ATTACHED.  2. moderate degenerative disc disease of the lower spine at levels 4 and 5.     FOR PAIN/FEVER, YOU MAY TAKE TYLENOL (acetaminophen). FOLLOW THE INSTRUCTIONS ON THE LABEL/CONTAINER.    CONTINUE YOUR HOME MEDICATIONS.    PLEASE FOLLOW UP WITH YOUR PRIVATE PHYSICIAN WITHIN THE NEXT 48 HOURS. BRING COPIES OF YOUR RESULTS.    PLEASE FOLLOW UP WITH ORTHOPEDICS WITHIN 1 WEEK. INFORMATION TO CALL AND MAKE AN APPOINTMENT IS PROVIDED.     RETURN TO THE EMERGENCY DEPARTMENT IF YOU EXPERIENCE ANY NEW/CONCERNING/WORSENING SYMPTOMS.    Abdominal Pain    WHAT YOU NEED TO KNOW:    What do I need to know about abdominal pain? Abdominal pain may be felt anywhere between the bottom of your rib cage and your groin. Acute pain usually lasts less than 3 months. Chronic pain lasts longer than 3 months. Your pain may be sharp or dull. The pain may stay in the same place or move around. You may have the pain all the time, or it may come and go. Depending on the cause, you may also have nausea, vomiting, fever, or diarrhea.  Abdominal Organs    What causes abdominal pain? The cause may not be found. The following are common causes:    Overeating, gas pains, or food poisoning    Constipation or diarrhea    An injury    Appendicitis, a hernia, or an ulcer    Infection or a blockage    A liver, gallbladder, or kidney condition  How is the cause of abdominal pain diagnosed? Your healthcare provider will check your abdomen. He or she will ask where your pain is and when it started. Tell him or her if the pain wakes you or stops you from doing your daily activities. Describe anything that makes the pain better or worse. You may also need any of the following:    Blood, urine, or bowel movement samples may be tested for signs of an infection, disease, or injury.    X-ray pictures of your abdomen may show an injury or other cause of the pain.  How is abdominal pain treated?    Prescription pain medicine may be given. Ask your healthcare provider how to take this medicine safely. Some prescription pain medicines contain acetaminophen. Do not take other medicines that contain acetaminophen without talking to your healthcare provider. Too much acetaminophen may cause liver damage. Prescription pain medicine may cause constipation. Ask your healthcare provider how to prevent or treat constipation.    Medicines may be given to calm your stomach or prevent vomiting.    Relaxation therapy may be used along with pain medicine.    Surgery may be needed, depending on the cause.  What can I do to manage or prevent abdominal pain?    Apply heat on your abdomen for 20 to 30 minutes every 2 hours for as many days as directed. Heat helps decrease pain and muscle spasms.    Make changes to the foods you eat, if needed. Do not eat foods that cause abdominal pain or other symptoms. Eat small meals more often. The following changes may also help:  Eat more high-fiber foods if you are constipated. High-fiber foods include fruits, vegetables, whole-grain foods, and legumes such as emanuel beans.        Do not eat foods that cause gas if you have bloating. Examples include broccoli, cabbage, beans, and carbonated drinks.    Do not eat foods or drinks that contain sorbitol or fructose if you have diarrhea and bloating. Some examples are fruit juices, candy, jelly, and sugar-free gum.    Do not eat high-fat foods. Examples include fried foods, cheeseburgers, hot dogs, and desserts.    Make changes to the liquids you drink, if needed. Do not drink liquids that cause pain or make it worse, such as orange juice. Drink liquids throughout the day to stay hydrated. The following changes may also help:  Drink more liquids to prevent dehydration from diarrhea or vomiting. Ask your healthcare provider how much liquid to drink each day and which liquids are best for you.    Limit or do not have caffeine. Caffeine may make symptoms such as heartburn or nausea worse.    Limit or do not drink alcohol. Alcohol can make your abdominal pain worse. Ask your healthcare provider if it is okay for you to drink alcohol. Also ask how much is okay for you to drink. A drink of alcohol is 12 ounces of beer, ½ ounce of liquor, or 5 ounces of wine.    Keep a diary of your abdominal pain. A diary may help your healthcare provider learn what is causing your pain. Include when the pain happens, how long it lasts, and what the pain feels like. Write down any other symptoms you have with abdominal pain. Also write down what you eat, and any symptoms you have after you eat.    Manage stress. Stress may cause abdominal pain. Your healthcare provider may recommend relaxation techniques and deep breathing exercises to help decrease your stress. Your healthcare provider may recommend you talk to someone about your stress or anxiety, such as a counselor or a friend. Get plenty of sleep. Exercise regularly.  [employer] [facility] for Exercise      Do not smoke. Nicotine and other chemicals in cigarettes can damage your esophagus and stomach. Ask your healthcare provider for information if you currently smoke and need help to quit. E-cigarettes or smokeless tobacco still contain nicotine. Talk to your healthcare provider before you use these products.  Call your local emergency number (911 in the ) if:    You have chest pain or shortness of breath.    When should I seek immediate care?    You have pulsing pain in your upper abdomen or lower back that suddenly becomes constant.    Your pain is in the right lower abdominal area and worsens with movement.    You have a fever over 100.4°F (38°C) or shaking chills.    You are vomiting and cannot keep food or liquids down.    Your pain does not improve or gets worse over the next 8 to 12 hours.    You see blood in your vomit or bowel movements, or they look black and tarry.    Your skin or the whites of your eyes turn yellow.    You are a woman and have a large amount of vaginal bleeding that is not your monthly period.  When should I call my doctor?    You have pain in your lower back.    You are a man and have pain in your testicles.    You have pain when you urinate.    You have questions or concerns about your condition or care.  CARE AGREEMENT:    You have the right to help plan your care. Learn about your health condition and how it may be treated. Discuss treatment options with your healthcare providers to decide what care you want to receive. You always have the right to refuse treatment.    _____________________________________________    A Patient's Guide to Avascular Necrosis of the Hip     Introduction  Bones are living tissue, and like all living tissue they rely on blood vessels to bring blood to keep them alive. Most living tissues have blood vessels that come from many directions into the tissue. If one blood vessel is damaged it may not cause problems, since there may be a backup blood supply coming in from a different direction. But certain joints of the body have only a few blood vessels that bring in blood. One of these joints is the hip. This document will describe what happens when this blood supply is damaged and results in what is called avascular necrosis (AVN) of the hip. Another name for this condition is osteonecrosis (which means "bone death").    This guide will help you understand    how AVN develops  how doctors diagnose the condition  what treatments are available  Anatomy  Where does AVN develop?    The hip joint is one of the true ball-and-socket joints of the body. The hip socket is called the acetabulum and forms a deep cup that surrounds the ball of the upper thigh bone. The thigh bone itself is called the femur, and the ball on the end is the femoral head. Thick muscles of the buttock at the back and the thick muscles of the thigh in the front surround the hip.    The surface of the femoral head and the inside of the acetabulum are covered with articular cartilage. This material is about one-quarter of an inch thick in most large joints. Articular cartilage is a tough, slick material that allows the surfaces to slide against one another without damage.    All of the blood supply comes into the ball that forms the hip joint through the neck of the femur (the femoral neck), a thinner area of bone that connects the ball to the shaft. If this blood supply is damaged, there is no backup. Damage to the blood supply can cause death of the bone that makes up the ball portion of the femur. Once this occurs, the bone is no longer able to maintain itself.    Living bone is always changing. To maintain a bone's strength, bone cells are constantly repairing the wear and tear that affects the bone tissue. If this process stops the bone can begin to weaken, just like rust can affect the metal structure of a bridge. Eventually, just like a esme bridge, the bone structure begins to collapse.    When AVN occurs in the hip joint, the top of the femoral head (the ball portion) collapses and begins to flatten. This occurs because this is where most of the weight is concentrated. The flattening creates a situation where the ball no longer fits perfectly inside the socket. Like two pieces of a mismatched piece of machinery, the joint begins to wear itself out. This leads to osteoarthritis of the hip joint, and pain.    Related Document: A Patient's Guide to Osteoarthritis of the Hip    Related Document: A Patient's Guide to Hip Anatomy    Causes  Why do I have this problem?    There are many causes of AVN. Anything that damages the blood supply to the hip can cause AVN.    Injury to the hip itself can damage the blood vessels. Fractures of the femoral neck (the area connecting the ball of the hip joint) can damage the blood vessels. A dislocation of the hip out of the socket can tear the blood vessels. It usually takes several months for AVN to show up, and it can even become a problem up to two years following this type of injury.    Some medications are known to cause AVN. Corticosteroids (cortisone) such as prednisone or methylprednisolone are the most common drugs known to lead to AVN. This is usually only a problem in patients who must take cortisone every day due to other diseases, such as advanced arthritis, or to prevent rejection of an organ transplant. Sometimes there is no choice, and cortisone has to be prescribed to treat a condition, knowing full well that AVN could occur. AVN has not been proven to be caused by local injection with cortisone, such as one or two injections into joints to treat arthritis or bursitis. But some patients have developed AVN within the first month of taking these drugs orally (pills by mouth). Patients taking both corticosteroids and statin drugs (cholesterol-lowering medications) seem to have the greatest risk for developing femoral head osteonecrosis.    A clear link exists between AVN lifestyle choices such as smoking and alcohol abuse. Smoking causes blood vessels to constrict or narrow thereby limiting the amount of blood flow to an area such as the hip with its already limited backup supply. Excessive alcohol intake somehow damages the blood vessels and leads to AVN. Deep sea divers and miners who work under great atmospheric pressures also are at risk for damage to the blood vessels. The pressure causes tiny bubbles to form in the blood stream which can block the blood vessels to the hip, damaging the blood supply.    Then there is a long list of other diseases and conditions that are associated with increased incidence of femoral head osteonecrosis. These are referred to as nontraumatic causes. For example, there is a link between osteonecrosis and more commonly known problems like leukemia, sickle cell diseases, and HIV infection and less well-known diseases such as Gaucher disease, hyperuricemia (a condition commonly called "gout"), and Caisson's disease.    Symptoms  What does AVN feel like?    The first symptom of AVN is pain when weight is placed on the hip. The pain can be felt in the groin area, the buttock area, and down the front of the thigh. As the problem progresses, the symptoms include development of a limp when walking and stiffness in the hip joint. Eventually, the pain will also be present at rest and may even interfere with sleep.    Diagnosis  How do doctors identify the condition?    The diagnosis of AVN begins with a history and physical examination. Your doctor will want to know about your occupation, what other medical problems you have, and your medication use. You'll be asked whether you drink alcohol. A physical examination will be done to determine how much stiffness you have in the hip and whether you have a limp. Once this is done, X-rays will most likely be ordered.    X-rays will usually show AVN if it has been present for long enough. In the very early stages, it may not show up on X-rays even though you are having pain. In the advanced stages, the hip joint will be very arthritic, and it may be hard to tell whether the main problem is AVN or advanced osteoarthritis of the hip. Either way, the treatment is basically the same.    If the X-rays fail to show AVN, you may have a bone scan done to determine if the pain in your hip is coming from early AVN. A bone scan involves injecting tracers into your blood stream. Several hours later, a large camera is used to take a picture of the bone around the hip joint. If there is no blood supply to the femoral head, the picture will show a blank spot where the femoral head should be outlined on the film.    The bone scan has pretty much been replaced with magnetic resonance imaging (MRI) today. The MRI scan is probably the most common test used to look for AVN of the hip. The MRI scanner uses magnetic waves instead of radiation. Multiple pictures of the hip bones are taken by the MRI scanner. The images look like slices of the bones. The MRI scan is very sensitive and can show even small areas of damage to the blood supply of the hip, even just hours after the damage has occurred.    Your surgeon will use these imaging studies to plan the best treatment approach for you. He or she will look at where the damage has occurred, the size of any lesions, whether or not there has been any collapse of the bone, and if any arthritic changes have developed. Based on these findings, the condition will be classified as either mild, moderate, or severe.    Treatment  What can be done for the condition?    Once AVN has occurred, the treatment choices are determined by how far along the problem is and your symptoms. Other factors that guide treatment decisions include your age, activity level, general health (and any specific health problems present), and life expectancy. For example, patients with other serious health problems or with a limited life expectancy might be treated with nonoperative care. While the symptoms may be reduced with pain medications and anti-inflammatory medications, no medical treatments will restore the blood supply to the femoral head and reverse the AVN.    Nonsurgical Treatment  If AVN is caught early, keeping weight off the sore-side foot when standing and walking may be helpful. Patients are shown how to use a walker or crutches to protect the hip. The idea is to permit healing and to prevent further damage to the hip. Patients may be shown stretches to avoid a loss of range of motion in the hip.  Anti-inflammatory medicine is often used to ease pain. Bisphosphonates are another groups of medications that can be helpful. One particular bisphosphonate (fosamax normally used for the treatment of osteoporosis) has been shown effective in reducing the risk of femoral head collapse in patients with avascular necrosis. In some cases, surgeons also prescribe biophysical treatment modalities such as electrical stimulation or shock wave therapy in an attempt to get the bone to heal. Sometimes these measures may help delay the need for surgery, but they rarely reverse the problem.    Surgery  If the femoral head has not begun to collapse, your surgeon may suggest an operation to try to increase the blood supply to the femoral head. Several operations have been designed to do this.    Decompressing the Femoral Head  The simplest operation is to drill one or several holes through the femoral neck and into the femoral head, trying to reach the area that lacks blood supply. The drill bores out a plug of bone within the femoral head. This operation is thought to do two things: (1) it creates a channel for new blood vessels to quickly form into the area that lacks blood supply, and (2) it relieves some of the pressure inside the bone of the femoral head. Relieving this pressure seems to help decrease the pain patients experience from AVN.       Decompression is often accompanied by the use of bone grafts with or without growth factors, a procedure designed to stimulate bone growth at the site of the defect. The donated bone comes from the patient (taken from the pelvic bone or lower leg). The bone is crushed up into tiny pieces and applied to the hole or defect caused by the necrotic process.    The decompression operation (with or without bone grafting) is done through a very small incision in the side of the thigh. The surgeon watches on a fluoroscope as a drill is used. A fluoroscope is a type of X-ray that shows the bones on a TV screen. The surgeon uses the fluoroscope to guide the drill where it needs to go. This operation is usually done as an outpatient procedure, and you will be able to go home with crutches the same day.    Fibular Bone Graft  A more complicated procedure to try to increase the blood supply to the femoral head is a vascularized fibular bone graft procedure. This is actually a tissue transplant. The graft is taken from the fibula (the thin bone that runs next to the shin bone). The graft is vascularized, meaning it has a blood supply of its own. Because it supports the femoral head, the graft is also referred to as a strut graft.    The surgeon removes a piece of the small bone in your lower leg (the fibula) along with the blood vessels to the bone. The surgeon then drills a hole through the side of the femur and into the femoral head. The surgeon attaches the blood vessels from the fibula to one of the blood vessels around the hip. This creates instant blood flow into the bone graft and into the head of the femur. This operation does two things: (1) it brings blood flow to the femoral head through the bone graft, and (2) the fibular bone graft is strong and keeps the femoral head from collapsing as the bone heals itself. This procedure is an inpatient procedure and will require you to stay in the hospital for several days.    This is a very complicated operation and is not commonly done. It is not always successful because the blood supply to the graft is fragile and may not form completely.       Rotational Osteotomy  In cases of small lesions involving less than one-third of the surface of the femoral head, rotational osteotomy has been very successful. The procedure involves making a cut through the bone and turning the head of the femur so that the necrotic bone is no longer bearing any weight.    Artificial Hip Replacement     When AVN is in the advanced stages, the condition is no different from osteoarthritis of the hip joint. Your surgeon will probably recommend replacing the hip with an artificial hip joint. For those patients with a limited bone defect that only affects the femoral head and does not extend into the hip socket, a resurfacing procedure might be considered. Hip resurfacing arthroplasty is a type of hip replacement that replaces the arthritic surface of the joint but removed far less bone than in the traditional hip replacement.    Related Document: A Patient's Guide to Artificial Joint Replacement of the Hip    Related Document: [relateddocument id=""]A Patient's Guide to Hip Resurfacing Arthroplasty[/relateddocument]    Rehabilitation  What should I expect following treatment?    Nonsurgical Rehabilitation  You may work with a physical therapist who will show you ways to safely move and stretch your hip. The goal is to keep your hip mobile and to avoid losing range of motion. Your therapist will also instruct you to use a walker or crutches. Keeping weight off your hip while you are standing or walking may help the bone to heal while protecting the femur from further damage.  After Surgery  After a simple drilling operation, you will probably use crutches for six weeks or so. The drill holes weaken the bone around the hip, making it possible to fracture the hip. Using crutches allows the bone to heal safely and reduce the risk that you may fracture your hip. Patients who have had bone and blood vessels grafted are required to limit how much weight they place on the hip for up to six months.    When you are safe in putting full weight through the leg, your doctor may have you work with a physical therapist to help regain hip range of motion and strength.    Patients who require hip resurfacing or artificial hip joint replacement follow a structured program of physical therapy beginning shortly after surgery.

## 2025-05-08 NOTE — ED PROVIDER NOTE - NSFOLLOWUPCLINICS_GEN_ALL_ED_FT
[facility]  Orthopedic Surgery  [address]  [place] [address]  Phone: [phone]  Fax:   Follow Up Time: 4-6 Days    [facility]  Orthopedic Surgery  [address], [address]th [place] [address]  Phone: [phone]  Fax:   Follow Up Time: 4-6 Days

## 2025-05-09 ENCOUNTER — NON-APPOINTMENT (OUTPATIENT)
Age: 65
End: 2025-05-09

## 2025-05-09 VITALS
OXYGEN SATURATION: 96 % | RESPIRATION RATE: 18 BRPM | HEART RATE: 82 BPM | DIASTOLIC BLOOD PRESSURE: 72 MMHG | SYSTOLIC BLOOD PRESSURE: 116 MMHG | TEMPERATURE: 99 F

## 2025-05-09 PROBLEM — D57.20 SICKLE-CELL/HB-C DISEASE WITHOUT CRISIS: Chronic | Status: ACTIVE | Noted: 2025-04-24

## 2025-05-10 LAB
CULTURE RESULTS: ABNORMAL
SPECIMEN SOURCE: SIGNIFICANT CHANGE UP

## 2025-05-11 RX ORDER — CEPHALEXIN 250 MG/1
1 CAPSULE ORAL
Qty: 14 | Refills: 0
Start: 2025-05-11 | End: 2025-05-17

## 2025-05-13 ENCOUNTER — APPOINTMENT (OUTPATIENT)
Dept: ORTHOPEDIC SURGERY | Facility: CLINIC | Age: 65
End: 2025-05-13
Payer: MEDICARE

## 2025-05-13 VITALS — WEIGHT: 190 LBS | HEIGHT: 66 IN | BODY MASS INDEX: 30.53 KG/M2

## 2025-05-13 DIAGNOSIS — M70.62 TROCHANTERIC BURSITIS, LEFT HIP: ICD-10-CM

## 2025-05-13 DIAGNOSIS — M16.12 UNILATERAL PRIMARY OSTEOARTHRITIS, LEFT HIP: ICD-10-CM

## 2025-05-13 DIAGNOSIS — M17.11 UNILATERAL PRIMARY OSTEOARTHRITIS, RIGHT KNEE: ICD-10-CM

## 2025-05-13 PROCEDURE — 73502 X-RAY EXAM HIP UNI 2-3 VIEWS: CPT | Mod: LT

## 2025-05-13 PROCEDURE — 73562 X-RAY EXAM OF KNEE 3: CPT | Mod: RT

## 2025-05-13 PROCEDURE — 99204 OFFICE O/P NEW MOD 45 MIN: CPT

## 2025-05-13 PROCEDURE — 72100 X-RAY EXAM L-S SPINE 2/3 VWS: CPT
